# Patient Record
Sex: FEMALE | Race: WHITE | NOT HISPANIC OR LATINO | Employment: PART TIME | URBAN - NONMETROPOLITAN AREA
[De-identification: names, ages, dates, MRNs, and addresses within clinical notes are randomized per-mention and may not be internally consistent; named-entity substitution may affect disease eponyms.]

---

## 2019-01-22 ENCOUNTER — OFFICE VISIT (OUTPATIENT)
Dept: CARDIAC SURGERY | Facility: CLINIC | Age: 59
End: 2019-01-22

## 2019-01-22 VITALS
BODY MASS INDEX: 29.27 KG/M2 | SYSTOLIC BLOOD PRESSURE: 140 MMHG | HEIGHT: 61 IN | WEIGHT: 155 LBS | HEART RATE: 98 BPM | OXYGEN SATURATION: 95 % | DIASTOLIC BLOOD PRESSURE: 82 MMHG

## 2019-01-22 DIAGNOSIS — R91.1 LUNG NODULE: Primary | ICD-10-CM

## 2019-01-22 DIAGNOSIS — I71.20 THORACIC AORTIC ANEURYSM WITHOUT RUPTURE (HCC): ICD-10-CM

## 2019-01-22 PROCEDURE — 99203 OFFICE O/P NEW LOW 30 MIN: CPT | Performed by: THORACIC SURGERY (CARDIOTHORACIC VASCULAR SURGERY)

## 2019-01-22 RX ORDER — ESCITALOPRAM OXALATE 20 MG/1
20 TABLET ORAL DAILY
COMMUNITY
End: 2022-06-02

## 2019-01-22 RX ORDER — ALBUTEROL SULFATE 2.5 MG/3ML
2.5 SOLUTION RESPIRATORY (INHALATION) EVERY 4 HOURS PRN
COMMUNITY

## 2019-01-22 RX ORDER — ESCITALOPRAM OXALATE 10 MG/1
10 TABLET ORAL DAILY
COMMUNITY
End: 2022-10-27

## 2019-01-22 RX ORDER — IBUPROFEN 200 MG
400 TABLET ORAL EVERY 6 HOURS PRN
COMMUNITY

## 2019-01-22 RX ORDER — DOXYCYCLINE 100 MG/1
CAPSULE ORAL
Refills: 0 | COMMUNITY
Start: 2019-01-11 | End: 2021-06-11

## 2019-01-22 RX ORDER — ALBUTEROL SULFATE 90 UG/1
2 AEROSOL, METERED RESPIRATORY (INHALATION) EVERY 4 HOURS PRN
COMMUNITY

## 2019-01-22 RX ORDER — TERBINAFINE HYDROCHLORIDE 250 MG/1
250 TABLET ORAL DAILY
COMMUNITY
End: 2021-06-11

## 2019-01-23 DIAGNOSIS — I71.40 ABDOMINAL AORTIC ANEURYSM (AAA) WITHOUT RUPTURE (HCC): Primary | ICD-10-CM

## 2019-01-23 DIAGNOSIS — Z00.6 EXAMINATION FOR NORMAL COMPARISON FOR CLINICAL RESEARCH: Primary | ICD-10-CM

## 2019-07-05 ENCOUNTER — TELEPHONE (OUTPATIENT)
Dept: CARDIAC SURGERY | Facility: CLINIC | Age: 59
End: 2019-07-05

## 2019-07-05 NOTE — TELEPHONE ENCOUNTER
PT CALLING TO SCHEDULE 6 MONTH F/U IN [place]. PT IS HAVING CT CHEST 7/9 @ 9:30 @ [facility] [place].

## 2019-09-24 ENCOUNTER — OFFICE VISIT (OUTPATIENT)
Dept: CARDIAC SURGERY | Facility: CLINIC | Age: 59
End: 2019-09-24

## 2019-09-24 VITALS
WEIGHT: 169 LBS | HEIGHT: 61 IN | BODY MASS INDEX: 31.91 KG/M2 | DIASTOLIC BLOOD PRESSURE: 90 MMHG | OXYGEN SATURATION: 96 % | HEART RATE: 66 BPM | SYSTOLIC BLOOD PRESSURE: 135 MMHG

## 2019-09-24 DIAGNOSIS — I71.20 THORACIC AORTIC ANEURYSM WITHOUT RUPTURE (HCC): Primary | ICD-10-CM

## 2019-09-24 PROCEDURE — 99212 OFFICE O/P EST SF 10 MIN: CPT | Performed by: PHYSICIAN ASSISTANT

## 2019-09-24 RX ORDER — LORATADINE 10 MG/1
10 TABLET ORAL DAILY
Refills: 1 | COMMUNITY
Start: 2019-09-21

## 2019-09-24 RX ORDER — MELOXICAM 15 MG/1
15 TABLET ORAL DAILY
Refills: 1 | COMMUNITY
Start: 2019-09-21

## 2019-09-24 NOTE — PROGRESS NOTES
2019  Patient Information  Victor M Mao                                                                                          [address]  [address]  [place] [address]   1960  'PCP/Referring Physician'  Darryl Victor M Loya, APRN  [phone]  No ref. provider found    Chief Complaint   Patient presents with   • Lung Nodule     6 month follow-up with CT chest for left lung nodule       History of Present Illness:  58-year-old  female with a history of active tobacco abuse and COPD presents to office today for follow up and review of CT scan of the chest and abdominal ultrasound for continued surveillance of right lung consolidations and incidental 4.2 cm ascending aortic aneurysm. Her most recent visit was on 19 in which she had been recently treated for pneumonia. She denies any chest pain or abdominal pain, but she does say she occasionally gets short of breath. She is continuing to smoke, but states she is planning to quit soon. Otherwise she is doing well.     Patient Active Problem List   Diagnosis   • Lung nodule   • Thoracic aortic aneurysm without rupture (CMS/HCC)     Past Medical History:   Diagnosis Date   • Anxiety    • Arthritis    • COPD (chronic obstructive pulmonary disease) (CMS/HCC)    • Degenerative disc disease, cervical    • Depression    • GERD (gastroesophageal reflux disease)    • Lung nodule      Past Surgical History:   Procedure Laterality Date   •  SECTION     • CHOLECYSTECTOMY     • COLONOSCOPY W/ POLYPECTOMY     • PAROTIDECTOMY     • TUBAL ABDOMINAL LIGATION         Current Outpatient Medications:   •  albuterol (PROVENTIL) (2.5 MG/3ML) 0.083% nebulizer solution, Take 2.5 mg by nebulization Every 4 (Four) Hours As Needed for Wheezing., Disp: , Rfl:   •  albuterol sulfate  (90 Base) MCG/ACT inhaler, Inhale 2 puffs Every 4 (Four) Hours As Needed for Wheezing., Disp: , Rfl:   •  ALLERGY RELIEF 10 MG tablet, Take 10 mg by mouth Daily., Disp: ,  Rfl: 1  •  BREO ELLIPTA 200-25 MCG/INH inhaler, , Disp: , Rfl:   •  escitalopram (LEXAPRO) 10 MG tablet, Take 10 mg by mouth Daily., Disp: , Rfl:   •  escitalopram (LEXAPRO) 20 MG tablet, Take 20 mg by mouth Daily., Disp: , Rfl:   •  meloxicam (MOBIC) 15 MG tablet, Take 15 mg by mouth Daily., Disp: , Rfl: 1  •  doxycycline (MONODOX) 100 MG capsule, TAKE 1 CAPSULE BY MOUTH TWICE A DAY FOR 10 DAYS, Disp: , Rfl: 0  •  ibuprofen (ADVIL,MOTRIN) 200 MG tablet, Take 200 mg by mouth Every 6 (Six) Hours As Needed for Mild Pain ., Disp: , Rfl:   •  terbinafine (lamiSIL) 250 MG tablet, Take 250 mg by mouth Daily., Disp: , Rfl:   No Known Allergies  Social History     Socioeconomic History   • Marital status: Unknown     Spouse name: Not on file   • Number of children: 1   • Years of education: Not on file   • Highest education level: Not on file   Occupational History   • Occupation: unemplyed [employer]     Comment: trying to get disability   Tobacco Use   • Smoking status: Current Every Day Smoker     Packs/day: 0.25     Years: 34.00     Pack years: 8.50     Types: Cigarettes   • Smokeless tobacco: Never Used   • Tobacco comment: smokes 3 cigs a day   Substance and Sexual Activity   • Alcohol use: Yes     Comment: very seldom   • Drug use: Yes     Types: Marijuana   Social History Narrative    Lives in [facility] with daughter.     Family History   Problem Relation Age of Onset   • Coronary artery disease Mother    • Heart attack Mother    • Aneurysm Father         aortic arch aneurysm   • Heart attack Brother    • Coronary artery disease Brother      Review of Systems   Constitution: Negative for chills, fever, malaise/fatigue, night sweats and weight loss.   HENT: Negative for hearing loss, odynophagia and sore throat.    Cardiovascular: Positive for dyspnea on exertion and palpitations. Negative for chest pain, leg swelling and orthopnea.   Respiratory: Positive for cough and wheezing. Negative for hemoptysis.   "  Endocrine: Negative for cold intolerance, heat intolerance, polydipsia, polyphagia and polyuria.   Hematologic/Lymphatic: Does not bruise/bleed easily.   Skin: Negative for itching and rash.   Musculoskeletal: Positive for joint pain and muscle cramps. Negative for joint swelling and myalgias.   Gastrointestinal: Positive for constipation. Negative for abdominal pain, diarrhea, hematemesis, hematochezia, melena, nausea and vomiting.   Genitourinary: Positive for nocturia. Negative for dysuria, frequency and hematuria.   Neurological: Negative for focal weakness, headaches, numbness and seizures.   Psychiatric/Behavioral: Positive for depression. Negative for suicidal ideas. The patient is nervous/anxious.    All other systems reviewed and are negative.    Vitals:    09/24/19 1025   BP: 135/90   BP Location: Right arm   Patient Position: Sitting   Pulse: 66   SpO2: 96%   Weight: 76.7 kg (169 lb)   Height: 154.9 cm (61\")      Physical Exam  Gen - NAD, pleasant, cooperative  CV -regular rate and rhythm, no murmur gallop or rub  Pulm -lungs clear to auscultation bilateral without wheeze or rhonchi  GI -soft, normoactive bowel sounds, nontender  Ext - without edema.   Incision -healing well, no evidence of incisional dehiscence or cellulitis  Neuro - CN II - XII grossly intact, tongue midline, voice normal    Labs/Imaging:  CT chest 9/16/19  IMPRESSION: Mild left lobe atelectasis. No acute airspace consolidation. Advanced atherosclerotic plaque formation throughout the coronary arteries. Stable 4.2 cm ectasia of the ascending thoracic aorta. Prominence of the central pulmonary.    US abd. Aortic - 1/31/19  IMPRESSION - no evidence of AAA    Assessment/Plan:  58-year-old  female with a history of active tobacco abuse and COPD presents to office today for follow up and review of CT scan of the chest and abdominal ultrasound for continued surveillance of right lung consolidations and incidental 4.2 cm ascending " aortic aneurysm. CT of the chest from [facility] shows no interval change in the 4.2 cm ascending aortic aneurysm. Her AAA ultrasound shows no sign of aneurysm. At this time we will play to see the patient again in 1 year with a repeat CT scan of the chest for continued surveillance.  The patient may call our office with any questions or concerns should any arise in the interval.    Patient Active Problem List   Diagnosis   • Lung nodule   • Thoracic aortic aneurysm without rupture (CMS/HCC)

## 2020-01-24 ENCOUNTER — APPOINTMENT (OUTPATIENT)
Dept: WOMENS IMAGING | Facility: HOSPITAL | Age: 60
End: 2020-01-24

## 2020-01-24 PROCEDURE — 77067 SCR MAMMO BI INCL CAD: CPT | Performed by: RADIOLOGY

## 2020-01-24 PROCEDURE — 77063 BREAST TOMOSYNTHESIS BI: CPT | Performed by: RADIOLOGY

## 2020-11-04 ENCOUNTER — TELEPHONE (OUTPATIENT)
Dept: CARDIAC SURGERY | Facility: CLINIC | Age: 60
End: 2020-11-04

## 2020-11-09 DIAGNOSIS — I71.20 THORACIC AORTIC ANEURYSM WITHOUT RUPTURE (HCC): Primary | ICD-10-CM

## 2021-01-26 ENCOUNTER — OFFICE VISIT (OUTPATIENT)
Dept: CARDIAC SURGERY | Facility: CLINIC | Age: 61
End: 2021-01-26

## 2021-01-26 DIAGNOSIS — I71.20 THORACIC AORTIC ANEURYSM WITHOUT RUPTURE (HCC): Primary | ICD-10-CM

## 2021-01-26 PROCEDURE — 99443 PR PHYS/QHP TELEPHONE EVALUATION 21-30 MIN: CPT | Performed by: NURSE PRACTITIONER

## 2021-01-26 RX ORDER — OMEPRAZOLE 20 MG/1
20 CAPSULE, DELAYED RELEASE ORAL DAILY
COMMUNITY

## 2021-01-26 RX ORDER — ATORVASTATIN CALCIUM 10 MG/1
10 TABLET, FILM COATED ORAL DAILY
COMMUNITY
End: 2021-06-11

## 2021-01-26 RX ORDER — ARIPIPRAZOLE 5 MG/1
5 TABLET ORAL DAILY
COMMUNITY

## 2021-01-26 RX ORDER — CHOLECALCIFEROL (VITAMIN D3) 125 MCG
2000 CAPSULE ORAL DAILY
COMMUNITY

## 2021-01-26 RX ORDER — LOSARTAN POTASSIUM 25 MG/1
25 TABLET ORAL DAILY
COMMUNITY
End: 2022-11-28 | Stop reason: ALTCHOICE

## 2021-01-26 NOTE — PROGRESS NOTES
You have chosen to receive care through a telephone visit. Do you consent to use a telephone visit for your medical care today? Yes       [facility] Cardiothoracic Surgery Office Follow Up Note     Date of Encounter: 01/26/2021     MRN Number: [id number]  Name: Victor M Mao  Phone Number: [phone]     Referred By: No ref. provider found  PCP: Ivonne Aguirre APRN    Chief Complaint:    Chief Complaint   Patient presents with   • Follow-up     1 year follow up to discuss CT chest results.   • Lung Nodule       History of Present Illness:    Victor M Mao is a 60 y.o. female with a history of COPD, ongoing tobacco use and ascending thoracic aortic aneurysm.  She presents today in telephone visit for discussion of her annual CT chest.  She denies any chest pain or flank pain.  She has chronic back pain.  Patient continues to smoke but is down to approximately 1/3 pack per day    Review of Systems:  Review of Systems   Constitution: Positive for weight gain. Negative for chills, decreased appetite, diaphoresis, fever, malaise/fatigue, night sweats and weight loss.   HENT: Positive for hearing loss and sore throat. Negative for congestion, hoarse voice and stridor.    Cardiovascular: Positive for dyspnea on exertion and leg swelling. Negative for chest pain, claudication, irregular heartbeat, near-syncope, orthopnea, palpitations, paroxysmal nocturnal dyspnea and syncope.   Respiratory: Negative.  Negative for cough, hemoptysis, shortness of breath, sleep disturbances due to breathing, snoring, sputum production and wheezing.    Hematologic/Lymphatic: Negative for adenopathy and bleeding problem. Does not bruise/bleed easily.   Skin: Negative.  Negative for color change, dry skin, itching, poor wound healing and rash.   Musculoskeletal: Positive for arthritis, back pain and joint pain. Negative for falls and muscle weakness.   Gastrointestinal: Negative for abdominal pain, anorexia, constipation, diarrhea,  hematochezia, melena, nausea and vomiting.   Neurological: Positive for dizziness. Negative for difficulty with concentration, disturbances in coordination, loss of balance, numbness, seizures, vertigo and weakness.   Psychiatric/Behavioral: Positive for depression. Negative for altered mental status, memory loss and substance abuse. The patient is nervous/anxious. The patient does not have insomnia.    Allergic/Immunologic: Negative for persistent infections.       I have reviewed the review of systems as entered by my clinical support staff and have updated it as appropriate.       Allergies:  No Known Allergies    Medications:      Current Outpatient Medications:   •  albuterol (PROVENTIL) (2.5 MG/3ML) 0.083% nebulizer solution, Take 2.5 mg by nebulization Every 4 (Four) Hours As Needed for Wheezing., Disp: , Rfl:   •  albuterol sulfate  (90 Base) MCG/ACT inhaler, Inhale 2 puffs Every 4 (Four) Hours As Needed for Wheezing., Disp: , Rfl:   •  ALLERGY RELIEF 10 MG tablet, Take 10 mg by mouth Daily., Disp: , Rfl: 1  •  ARIPiprazole (ABILIFY) 5 MG tablet, Take 5 mg by mouth Daily., Disp: , Rfl:   •  atorvastatin (LIPITOR) 10 MG tablet, Take 10 mg by mouth Daily., Disp: , Rfl:   •  BREO ELLIPTA 200-25 MCG/INH inhaler, , Disp: , Rfl:   •  escitalopram (LEXAPRO) 10 MG tablet, Take 10 mg by mouth Daily., Disp: , Rfl:   •  escitalopram (LEXAPRO) 20 MG tablet, Take 20 mg by mouth Daily., Disp: , Rfl:   •  ibuprofen (ADVIL,MOTRIN) 200 MG tablet, Take 200 mg by mouth Every 6 (Six) Hours As Needed for Mild Pain ., Disp: , Rfl:   •  losartan (COZAAR) 25 MG tablet, Take 25 mg by mouth Daily., Disp: , Rfl:   •  meloxicam (MOBIC) 15 MG tablet, Take 15 mg by mouth Daily., Disp: , Rfl: 1  •  omeprazole (priLOSEC) 20 MG capsule, Take 20 mg by mouth Daily., Disp: , Rfl:   •  vitamin D3 125 MCG (5000 UT) capsule capsule, Take 5,000 Units by mouth Daily., Disp: , Rfl:   •  doxycycline (MONODOX) 100 MG capsule, TAKE 1 CAPSULE BY  MOUTH TWICE A DAY FOR 10 DAYS, Disp: , Rfl: 0  •  terbinafine (lamiSIL) 250 MG tablet, Take 250 mg by mouth Daily., Disp: , Rfl:     Social History     Socioeconomic History   • Marital status: Unknown     Spouse name: Not on file   • Number of children: 1   • Years of education: Not on file   • Highest education level: Not on file   Occupational History   • Occupation: unemplyed RN - [employer]     Comment: trying to get disability   Tobacco Use   • Smoking status: Current Every Day Smoker     Packs/day: 0.25     Years: 34.00     Pack years: 8.50     Types: Cigarettes   • Smokeless tobacco: Never Used   • Tobacco comment: smokes 3 cigs a day   Substance and Sexual Activity   • Alcohol use: Yes     Comment: very seldom   • Drug use: Yes     Types: Marijuana   Social History Narrative    Lives in [facility] with daughter.       Family History   Problem Relation Age of Onset   • Coronary artery disease Mother    • Heart attack Mother    • Aneurysm Father         aortic arch aneurysm   • Heart attack Brother    • Coronary artery disease Brother        Past Medical History:   Diagnosis Date   • Anxiety    • Arthritis    • COPD (chronic obstructive pulmonary disease) (CMS/HCC)    • Degenerative disc disease, cervical    • Depression    • GERD (gastroesophageal reflux disease)    • Lung nodule        Past Surgical History:   Procedure Laterality Date   •  SECTION     • CHOLECYSTECTOMY     • COLONOSCOPY W/ POLYPECTOMY     • PAROTIDECTOMY     • TUBAL ABDOMINAL LIGATION         Physical Exam:  Vital Signs:  There were no vitals filed for this visit.  There is no height or weight on file to calculate BMI.     Physical Exam  Pulmonary:      Effort: Pulmonary effort is normal.   Neurological:      Mental Status: She is alert.   Psychiatric:         Mood and Affect: Mood normal.         Thought Content: Thought content normal.         Labs/Imaging:    CTA chest 1/15/2021: Stable unchanged 4.1 cm ectasia of the  ascending thoracic aorta.  Mild left basal atelectasis.  No nodules noted.  Diffuse atherosclerotic plaque formation throughout the coronary arteries with dilation of the central pulmonary artery which can be seen with pulmonary arterial hypertension.    Assessment / Plan:  Diagnoses and all orders for this visit:    1. Thoracic aortic aneurysm without rupture (CMS/HCC) (Primary)     Victor M Mao is a 60 y.o. female with a history of COPD, ongoing tobacco use and ascending thoracic aortic aneurysm.  Discussed findings of CTA chest with unchanged stable 4.1 cm ascending thoracic aortic aneurysm.  All questions answered.  Have advised patient to quit smoking.  We will follow-up with patient in 1 year with CT chest with contrast.    This visit has been rescheduled as a phone visit to comply with patient safety concerns in accordance with CDC recommendations. Total time of discussion was 21 minutes.      HARSHAL Parry  [facility] Cardiothoracic Surgery    Please note that portions of this note may have been completed with a voice recognition program. Efforts were made to edit the dictations, but occasionally words are mistranscribed.

## 2021-06-10 PROBLEM — R00.2 PALPITATIONS: Status: ACTIVE | Noted: 2021-06-10

## 2021-06-10 PROBLEM — K21.9 GERD (GASTROESOPHAGEAL REFLUX DISEASE): Status: ACTIVE | Noted: 2021-06-10

## 2021-06-10 PROBLEM — J44.9 COPD (CHRONIC OBSTRUCTIVE PULMONARY DISEASE): Status: ACTIVE | Noted: 2021-06-10

## 2021-06-10 PROBLEM — E78.2 MIXED HYPERLIPIDEMIA: Status: ACTIVE | Noted: 2021-06-10

## 2021-06-10 PROBLEM — I10 ESSENTIAL HYPERTENSION: Status: ACTIVE | Noted: 2021-06-10

## 2021-06-10 PROBLEM — E11.9 DM (DIABETES MELLITUS), TYPE 2 (HCC): Status: ACTIVE | Noted: 2021-06-10

## 2021-06-10 PROBLEM — M50.30 DDD (DEGENERATIVE DISC DISEASE), CERVICAL: Status: ACTIVE | Noted: 2021-06-10

## 2021-06-10 NOTE — PATIENT INSTRUCTIONS
Obesity, Adult  Obesity is the condition of having too much total body fat. Being overweight or obese means that your weight is greater than what is considered healthy for your body size. Obesity is determined by a measurement called BMI. BMI is an estimate of body fat and is calculated from height and weight. For adults, a BMI of 30 or higher is considered obese.  Obesity can lead to other health concerns and major illnesses, including:  · Stroke.  · Coronary artery disease (CAD).  · Type 2 diabetes.  · Some types of cancer, including cancers of the colon, breast, uterus, and gallbladder.  · Osteoarthritis.  · High blood pressure (hypertension).  · High cholesterol.  · Sleep apnea.  · Gallbladder stones.  · Infertility problems.  What are the causes?  Common causes of this condition include:  · Eating daily meals that are high in calories, sugar, and fat.  · Being born with genes that may make you more likely to become obese.  · Having a medical condition that causes obesity, including:  ? Hypothyroidism.  ? Polycystic ovarian syndrome (PCOS).  ? Binge-eating disorder.  ? Cushing syndrome.  · Taking certain medicines, such as steroids, antidepressants, and seizure medicines.  · Not being physically active (sedentary lifestyle).  · Not getting enough sleep.  · Drinking high amounts of sugar-sweetened beverages, such as soft drinks.  What increases the risk?  The following factors may make you more likely to develop this condition:  · Having a family history of obesity.  · Being a woman of  descent.  · Being a man of  descent.  · Living in an area with limited access to:  ? [place], recreation centers, or sidewalks.  ? Healthy food choices, such as grocery stores and farmers' markets.  What are the signs or symptoms?  The main sign of this condition is having too much body fat.  How is this diagnosed?  This condition is diagnosed based on:  · Your BMI. If you are an adult with a BMI of 30 or  higher, you are considered obese.  · Your waist circumference. This measures the distance around your waistline.  · Your skinfold thickness. Your health care provider may gently pinch a fold of your skin and measure it.  You may have other tests to check for underlying conditions.  How is this treated?  Treatment for this condition often includes changing your lifestyle. Treatment may include some or all of the following:  · Dietary changes. This may include developing a healthy meal plan.  · Regular physical activity. This may include activity that causes your heart to beat faster (aerobic exercise) and strength training. Work with your health care provider to design an exercise program that works for you.  · Medicine to help you lose weight if you are unable to lose 1 pound a week after 6 weeks of healthy eating and more physical activity.  · Treating conditions that cause the obesity (underlying conditions).  · Surgery. Surgical options may include gastric banding and gastric bypass. Surgery may be done if:  ? Other treatments have not helped to improve your condition.  ? You have a BMI of 40 or higher.  ? You have life-threatening health problems related to obesity.  Follow these instructions at home:  Eating and drinking    · Follow recommendations from your health care provider about what you eat and drink. Your health care provider may advise you to:  ? Limit fast food, sweets, and processed snack foods.  ? Choose low-fat options, such as low-fat milk instead of whole milk.  ? Eat 5 or more servings of fruits or vegetables every day.  ? Eat at home more often. This gives you more control over what you eat.  ? Choose healthy foods when you eat out.  ? Learn to read food labels. This will help you understand how much food is considered 1 serving.  ? Learn what a healthy serving size is.  ? Keep low-fat snacks available.  ? Limit sugary drinks, such as soda, fruit juice, sweetened iced tea, and flavored  milk.  · Drink enough water to keep your urine pale yellow.  · Do not follow a fad diet. Fad diets can be unhealthy and even dangerous.  Physical activity  · Exercise regularly, as told by your health care provider.  ? Most adults should get up to 150 minutes of moderate-intensity exercise every week.  ? Ask your health care provider what types of exercise are safe for you and how often you should exercise.  · Warm up and stretch before being active.  · Cool down and stretch after being active.  · Rest between periods of activity.  Lifestyle  · Work with your health care provider and a dietitian to set a weight-loss goal that is healthy and reasonable for you.  · Limit your screen time.  · Find ways to reward yourself that do not involve food.  · Do not drink alcohol if:  ? Your health care provider tells you not to drink.  ? You are pregnant, may be pregnant, or are planning to become pregnant.  · If you drink alcohol:  ? Limit how much you use to:  § 0-1 drink a day for women.  § 0-2 drinks a day for men.  ? Be aware of how much alcohol is in your drink. In the [place] one drink equals one 12 oz bottle of beer (355 mL), one 5 oz glass of wine (148 mL), or one 1½ oz glass of hard liquor (44 mL).  General instructions  · Keep a weight-loss journal to keep track of the food you eat and how much exercise you get.  · Take over-the-counter and prescription medicines only as told by your health care provider.  · Take vitamins and supplements only as told by your health care provider.  · Consider joining a support group. Your health care provider may be able to recommend a support group.  · Keep all follow-up visits as told by your health care provider. This is important.  Contact a health care provider if:  · You are unable to meet your weight loss goal after 6 weeks of dietary and lifestyle changes.  Get help right away if you are having:  · Trouble breathing.  · Suicidal thoughts or behaviors.  Summary  · Obesity is the  condition of having too much total body fat.  · Being overweight or obese means that your weight is greater than what is considered healthy for your body size.  · Work with your health care provider and a dietitian to set a weight-loss goal that is healthy and reasonable for you.  · Exercise regularly, as told by your health care provider. Ask your health care provider what types of exercise are safe for you and how often you should exercise.  This information is not intended to replace advice given to you by your health care provider. Make sure you discuss any questions you have with your health care provider.  Document Revised: 08/22/2019 Document Reviewed: 08/22/2019  [employer] Patient Education © 2021 [employer]  MyPlate from [employer]    MyPlate is an outline of a general healthy diet based on the 2010 Dietary Guidelines for Americans, from the U.S. Department of Agriculture (USDA). It sets guidelines for how much food you should eat from each food group based on your age, sex, and level of physical activity.  What are tips for following MyPlate?  To follow MyPlate recommendations:  · Eat a wide variety of fruits and vegetables, grains, and protein foods.  · Serve smaller portions and eat less food throughout the day.  · Limit portion sizes to avoid overeating.  · Enjoy your food.  · Get at least 150 minutes of exercise every week. This is about 30 minutes each day, 5 or more days per week.  It can be difficult to have every meal look like MyPlate. Think about MyPlate as eating guidelines for an entire day, rather than each individual meal.  Fruits and vegetables  · Make half of your plate fruits and vegetables.  · Eat many different colors of fruits and vegetables each day.  · For a 2,000 calorie daily food plan, eat:  ? 2½ cups of vegetables every day.  ? 2 cups of fruit every day.  · 1 cup is equal to:  ? 1 cup raw or cooked vegetables.  ? 1 cup raw fruit.  ? 1 medium-sized orange, apple, or banana.  ? 1 cup  100% fruit or vegetable juice.  ? 2 cups raw leafy greens, such as lettuce, spinach, or kale.  ? ½ cup dried fruit.  Grains  · One fourth of your plate should be grains.  · Make at least half of the grains you eat each day whole grains.  · For a 2,000 calorie daily food plan, eat 6 oz of grains every day.  · 1 oz is equal to:  ? 1 slice bread.  ? 1 cup cereal.  ? ½ cup cooked rice, cereal, or pasta.  Protein  · One fourth of your plate should be protein.  · Eat a wide variety of protein foods, including meat, poultry, fish, eggs, beans, nuts, and tofu.  · For a 2,000 calorie daily food plan, eat 5½ oz of protein every day.  · 1 oz is equal to:  ? 1 oz meat, poultry, or fish.  ? ¼ cup cooked beans.  ? 1 egg.  ? ½ oz nuts or seeds.  ? 1 Tbsp peanut butter.  Dairy  · Drink fat-free or low-fat (1%) milk.  · Eat or drink dairy as a side to meals.  · For a 2,000 calorie daily food plan, eat or drink 3 cups of dairy every day.  · 1 cup is equal to:  ? 1 cup milk, yogurt, cottage cheese, or soy milk (soy beverage).  ? 2 oz processed cheese.  ? 1½ oz natural cheese.  Fats, oils, salt, and sugars  · Only small amounts of oils are recommended.  · Avoid foods that are high in calories and low in nutritional value (empty calories), like foods high in fat or added sugars.  · Choose foods that are low in salt (sodium). Choose foods that have less than 140 milligrams (mg) of sodium per serving.  · Drink water instead of sugary drinks. Drink enough water each day to keep your urine pale yellow.  Where to find support  · Work with your health care provider or a nutrition specialist (dietitian) to develop a customized eating plan that is right for you.  · Download an carley (mobile application) to help you track your daily food intake.  Where to find more information  · Go to ChooseMyPlate.gov for more information.  Summary  · MyPlate is a general guideline for healthy eating from the USDA. It is based on the 2010 Dietary Guidelines for  Americans.  · In general, fruits and vegetables should take up ½ of your plate, grains should take up ¼ of your plate, and protein should take up ¼ of your plate.  This information is not intended to replace advice given to you by your health care provider. Make sure you discuss any questions you have with your health care provider.  Document Revised: 05/21/2020 Document Reviewed: 03/19/2018  [employer] Patient Education © 2021 [employer]

## 2021-06-11 ENCOUNTER — OFFICE VISIT (OUTPATIENT)
Dept: CARDIOLOGY | Facility: CLINIC | Age: 61
End: 2021-06-11

## 2021-06-11 VITALS
WEIGHT: 169.2 LBS | OXYGEN SATURATION: 92 % | HEIGHT: 62 IN | HEART RATE: 72 BPM | DIASTOLIC BLOOD PRESSURE: 84 MMHG | BODY MASS INDEX: 31.14 KG/M2 | SYSTOLIC BLOOD PRESSURE: 116 MMHG

## 2021-06-11 DIAGNOSIS — R00.2 PALPITATIONS: Primary | ICD-10-CM

## 2021-06-11 DIAGNOSIS — F17.210 CIGARETTE SMOKER: ICD-10-CM

## 2021-06-11 DIAGNOSIS — E78.2 MIXED HYPERLIPIDEMIA: ICD-10-CM

## 2021-06-11 DIAGNOSIS — I10 ESSENTIAL HYPERTENSION: ICD-10-CM

## 2021-06-11 DIAGNOSIS — I71.20 THORACIC AORTIC ANEURYSM WITHOUT RUPTURE (HCC): ICD-10-CM

## 2021-06-11 PROCEDURE — 99204 OFFICE O/P NEW MOD 45 MIN: CPT | Performed by: SPECIALIST

## 2021-06-11 PROCEDURE — 93000 ELECTROCARDIOGRAM COMPLETE: CPT | Performed by: SPECIALIST

## 2021-06-11 RX ORDER — HYDROCHLOROTHIAZIDE 25 MG/1
25 TABLET ORAL EVERY MORNING
COMMUNITY
Start: 2021-05-25

## 2021-06-11 RX ORDER — TIOTROPIUM BROMIDE 18 UG/1
CAPSULE ORAL; RESPIRATORY (INHALATION) DAILY
COMMUNITY
Start: 2021-05-25 | End: 2022-06-02

## 2021-06-11 RX ORDER — ATORVASTATIN CALCIUM 20 MG/1
20 TABLET, FILM COATED ORAL DAILY
COMMUNITY
Start: 2021-06-08

## 2021-06-11 RX ORDER — METFORMIN HYDROCHLORIDE 500 MG/1
500 TABLET, EXTENDED RELEASE ORAL DAILY
COMMUNITY
Start: 2021-06-08

## 2021-06-11 NOTE — PROGRESS NOTES
Subjective   Initial consultation, palpitations  Victor M Mao is a 61 y.o. female who presents to day for Establish Care (Here for eval. ) and Palpitations.    CHIEF COMPLIANT  Chief Complaint   Patient presents with   • Establish Care     Here for eval.    • Palpitations     Problem List:    1. Palpitations  2. HTN  3. Hyperlipidemia  4. Thoracic AA  5. COPD  6. Lung nodule  7. DDD, cervical  8. GERD  9. Chronic smoker    Problem List Items Addressed This Visit        Cardiac and Vasculature    Thoracic aortic aneurysm without rupture (CMS/HCC)    Relevant Orders    Adult Transthoracic Echo Complete W/ Cont if Necessary Per Protocol    Palpitations - Primary    Relevant Orders    ECG 12 Lead    Adult Transthoracic Echo Complete W/ Cont if Necessary Per Protocol    Cardiac Event Monitor    Mixed hyperlipidemia    Relevant Medications    atorvastatin (LIPITOR) 20 MG tablet    Essential hypertension    Relevant Medications    hydroCHLOROthiazide (HYDRODIURIL) 12.5 MG tablet       Tobacco    Cigarette smoker          HPI  Start noticing her heart skipping beats for about 2 3 weeks now last to 3 weeks happens about 2 or 3 times when she feels her heart skips for few seconds in succession for about 10 minutes usually at rest at night no dizziness no history of syncope she denies any shortness of breath no chest pain she does have edema and she was started on Lasix and the edema now is only at night he sleeps in 2 pillows but she denies any orthopnea she denies any PND she states that she was diagnosed with COPD she had a sleep study which was negative and she is using oxygen at night because of hypoxia, she also enlarged thoracic aorta with a CT scan showed an aorta about 4.2 Dr. Zurita is monitoring that last CT scan was done in January of this year which showed no change comparing with previous CT scan she smokes about half a pack per day for 30 years has hypertension also has diabetes she has hyperlipidemia she  started taking atorvastatin 2 months ago her mother had a sudden cardiac death at age of 50 her brother  sudden cardiac death history 32 father has also started aortic aneurysm repair, her brother had autopsy also does not have significant coronary artery disease                      PRIOR MEDS  Current Outpatient Medications on File Prior to Visit   Medication Sig Dispense Refill   • albuterol (PROVENTIL) (2.5 MG/3ML) 0.083% nebulizer solution Take 2.5 mg by nebulization Every 4 (Four) Hours As Needed for Wheezing.     • albuterol sulfate  (90 Base) MCG/ACT inhaler Inhale 2 puffs Every 4 (Four) Hours As Needed for Wheezing.     • ALLERGY RELIEF 10 MG tablet Take 10 mg by mouth Daily.  1   • ARIPiprazole (ABILIFY) 5 MG tablet Take 5 mg by mouth Daily.     • atorvastatin (LIPITOR) 20 MG tablet Take 20 mg by mouth Daily.     • BREO ELLIPTA 200-25 MCG/INH inhaler Inhale 1 puff Daily.     • Cholecalciferol (Vitamin D3) 50 MCG (2000 UT) tablet Take 2,000 Units by mouth Daily.     • escitalopram (LEXAPRO) 10 MG tablet Take 10 mg by mouth Daily.     • escitalopram (LEXAPRO) 20 MG tablet Take 20 mg by mouth Daily.     • hydroCHLOROthiazide (HYDRODIURIL) 12.5 MG tablet Take 12.5 mg by mouth Every Morning.     • ibuprofen (ADVIL,MOTRIN) 200 MG tablet Take 400 mg by mouth Every 6 (Six) Hours As Needed for Mild Pain .     • losartan (COZAAR) 25 MG tablet Take 25 mg by mouth Daily.     • meloxicam (MOBIC) 15 MG tablet Take 15 mg by mouth Daily.  1   • metFORMIN ER (GLUCOPHAGE-XR) 500 MG 24 hr tablet Take 500 mg by mouth Daily.     • O2 (OXYGEN) Inhale 2 L/min Every Night.     • omeprazole (priLOSEC) 20 MG capsule Take 20 mg by mouth Daily.     • Spiriva HandiHaler 18 MCG per inhalation capsule Daily.     • [DISCONTINUED] atorvastatin (LIPITOR) 10 MG tablet Take 10 mg by mouth Daily.     • [DISCONTINUED] doxycycline (MONODOX) 100 MG capsule TAKE 1 CAPSULE BY MOUTH TWICE A DAY FOR 10 DAYS  0   • [DISCONTINUED]  terbinafine (lamiSIL) 250 MG tablet Take 250 mg by mouth Daily.       No current facility-administered medications on file prior to visit.       ALLERGIES  Patient has no known allergies.    HISTORY  Past Medical History:   Diagnosis Date   • Anxiety    • Arthritis    • COPD (chronic obstructive pulmonary disease) (CMS/[facility])    • Degenerative disc disease, cervical    • Depression    • Diabetes mellitus (CMS/[facility])    • GERD (gastroesophageal reflux disease)    • HTN (hypertension)    • Hyperlipidemia    • Lung nodule    • Palpitation    • Thoracic aortic aneurysm (CMS/[facility])        Social History     Socioeconomic History   • Marital status: Unknown     Spouse name: Not on file   • Number of children: 1   • Years of education: Not on file   • Highest education level: Not on file   Tobacco Use   • Smoking status: Current Every Day Smoker     Packs/day: 0.25     Years: 34.00     Pack years: 8.50     Types: Cigarettes   • Smokeless tobacco: Never Used   • Tobacco comment: smokes 10 cigs a day or less   Substance and Sexual Activity   • Alcohol use: Yes     Comment: very seldom   • Drug use: Yes     Types: Marijuana       Family History   Problem Relation Age of Onset   • Coronary artery disease Mother    • Heart attack Mother    • Aneurysm Father         aortic arch aneurysm   • Heart attack Brother    • Coronary artery disease Brother        Review of Systems   Constitutional: Positive for fatigue.   HENT: Negative.    Eyes: Positive for visual disturbance (wears glasses).   Respiratory: Positive for shortness of breath (with exertion).    Cardiovascular: Positive for palpitations and leg swelling (ankle). Negative for chest pain.   Gastrointestinal: Positive for constipation.   Endocrine: Negative.    Genitourinary: Negative.    Musculoskeletal: Positive for arthralgias and myalgias.   Skin: Negative.    Allergic/Immunologic: Positive for environmental allergies.   Neurological: Positive for dizziness and  "light-headedness. Negative for syncope.        Couple falls, last approx. 4 mo. ago   Hematological: Negative.    Psychiatric/Behavioral: Positive for sleep disturbance (off and on).       Objective     VITALS: /84 (BP Location: Left arm, Patient Position: Sitting)   Pulse 72   Ht 156.5 cm (61.6\")   Wt 76.7 kg (169 lb 3.2 oz)   SpO2 92%   BMI 31.35 kg/m²     LABS:   Lab Results (most recent)     None          IMAGING:   No Images in the past 120 days found..    EXAM:  Physical Exam  Vitals reviewed.   Constitutional:       Appearance: She is well-developed.   HENT:      Head: Normocephalic and atraumatic.   Eyes:      Pupils: Pupils are equal, round, and reactive to light.   Neck:      Thyroid: No thyromegaly.      Vascular: No JVD.   Cardiovascular:      Rate and Rhythm: Normal rate and regular rhythm.      Heart sounds: Normal heart sounds. No murmur heard.   No friction rub. No gallop.    Pulmonary:      Effort: Pulmonary effort is normal. No respiratory distress.      Breath sounds: Normal breath sounds. No stridor. No wheezing or rales.   Chest:      Chest wall: No tenderness.   Musculoskeletal:         General: No tenderness or deformity.      Cervical back: Neck supple.   Skin:     General: Skin is warm and dry.   Neurological:      Mental Status: She is alert and oriented to person, place, and time.      Cranial Nerves: No cranial nerve deficit.      Coordination: Coordination normal.         Procedure     ECG 12 Lead    Date/Time: 6/11/2021 11:57 AM  Performed by: Ronni Jeffers MD  Authorized by: Ronni Jeffers MD   Comparison: not compared with previous ECG   Previous ECG: no previous ECG available          EKG: Normal sinus rhythm otherwise within normal limits no previous EKGs available to compare       Assessment/Plan     Diagnoses and all orders for this visit:    1. Palpitations (Primary)  -     ECG 12 Lead  -     Adult Transthoracic Echo Complete W/ Cont if Necessary Per Protocol; " Future  -     Cardiac Event Monitor; Future    2. Essential hypertension    3. Mixed hyperlipidemia    4. Thoracic aortic aneurysm without rupture (CMS/HCC)  -     Adult Transthoracic Echo Complete W/ Cont if Necessary Per Protocol; Future    5. Cigarette smoker    1.  She is having palpitations and want to get an event monitor assessment of arrhythmia we will also get an echocardiogram to assess her cardiac function wall motion valve morphology  2.  She has hyperlipidemia I do not have the results of her labs she started on Lipitor  3.  She has thoracic aortic aneurysm her last CT on January 15, 2020 showed size of 4.1 which come down from 4.2 on previous CT on 2019 Dr. Zurita is monitoring  4.  Discussed the issue about tobacco abuse and she promised that she will going to quit by the next visit she already has nicotine patch    Return in about 4 weeks (around 7/9/2021).             MEDS ORDERED DURING VISIT:  No orders of the defined types were placed in this encounter.      As always, Ivonne Aguirre, HARSHAL  I appreciate very much the opportunity to participate in the cardiovascular care of your patients. Please do not hesitate to call me with any questions with regards to Victor M Claunch evaluation and management.         This document has been electronically signed by Ronni Jeffers MD  June 11, 2021 12:44 EDT

## 2021-07-30 ENCOUNTER — HOSPITAL ENCOUNTER (OUTPATIENT)
Dept: CARDIOLOGY | Facility: HOSPITAL | Age: 61
Discharge: HOME | End: 2021-07-30
Admitting: SPECIALIST

## 2021-07-30 VITALS — BODY MASS INDEX: 31.93 KG/M2 | WEIGHT: 169.09 LBS | HEIGHT: 61 IN

## 2021-07-30 DIAGNOSIS — I71.20 THORACIC AORTIC ANEURYSM WITHOUT RUPTURE (HCC): ICD-10-CM

## 2021-07-30 DIAGNOSIS — R00.2 PALPITATIONS: ICD-10-CM

## 2021-07-30 LAB
AORTIC DIMENSIONLESS INDEX: 0.8 (DI)
BH CV ECHO MEAS - ACS: 1.5 CM
BH CV ECHO MEAS - AO MAX PG (FULL): 2.4 MMHG
BH CV ECHO MEAS - AO MAX PG: 7.1 MMHG
BH CV ECHO MEAS - AO MEAN PG (FULL): 1 MMHG
BH CV ECHO MEAS - AO MEAN PG: 3 MMHG
BH CV ECHO MEAS - AO ROOT AREA (BSA CORRECTED): 1.8
BH CV ECHO MEAS - AO ROOT AREA: 8 CM^2
BH CV ECHO MEAS - AO ROOT DIAM: 3.2 CM
BH CV ECHO MEAS - AO V2 MAX: 133 CM/SEC
BH CV ECHO MEAS - AO V2 MEAN: 77.2 CM/SEC
BH CV ECHO MEAS - AO V2 VTI: 23.8 CM
BH CV ECHO MEAS - BSA(HAYCOCK): 1.9 M^2
BH CV ECHO MEAS - BSA: 1.8 M^2
BH CV ECHO MEAS - BZI_BMI: 30.9 KILOGRAMS/M^2
BH CV ECHO MEAS - BZI_METRIC_HEIGHT: 157.5 CM
BH CV ECHO MEAS - BZI_METRIC_WEIGHT: 76.7 KG
BH CV ECHO MEAS - EDV(CUBED): 64 ML
BH CV ECHO MEAS - EDV(TEICH): 70 ML
BH CV ECHO MEAS - EF(CUBED): 57.4 %
BH CV ECHO MEAS - EF(MOD-BP): 50 %
BH CV ECHO MEAS - EF(TEICH): 49.6 %
BH CV ECHO MEAS - EF_3D-VOL: 52 %
BH CV ECHO MEAS - ESV(CUBED): 27.3 ML
BH CV ECHO MEAS - ESV(TEICH): 35.3 ML
BH CV ECHO MEAS - FS: 24.8 %
BH CV ECHO MEAS - IVS/LVPW: 0.92
BH CV ECHO MEAS - IVSD: 1.2 CM
BH CV ECHO MEAS - LA DIMENSION: 4.2 CM
BH CV ECHO MEAS - LA/AO: 1.3
BH CV ECHO MEAS - LAT PEAK E' VEL: 7.2 CM/SEC
BH CV ECHO MEAS - LV IVRT: 0.11 SEC
BH CV ECHO MEAS - LV MASS(C)D: 165.5 GRAMS
BH CV ECHO MEAS - LV MASS(C)DI: 93 GRAMS/M^2
BH CV ECHO MEAS - LV MAX PG: 4.7 MMHG
BH CV ECHO MEAS - LV MEAN PG: 2 MMHG
BH CV ECHO MEAS - LV V1 MAX: 108 CM/SEC
BH CV ECHO MEAS - LV V1 MEAN: 66.2 CM/SEC
BH CV ECHO MEAS - LV V1 VTI: 23.9 CM
BH CV ECHO MEAS - LVIDD: 4 CM
BH CV ECHO MEAS - LVIDS: 3 CM
BH CV ECHO MEAS - LVPWD: 1.3 CM
BH CV ECHO MEAS - MED PEAK E' VEL: 4.4 CM/SEC
BH CV ECHO MEAS - MV A MAX VEL: 76.5 CM/SEC
BH CV ECHO MEAS - MV DEC SLOPE: 357 CM/SEC^2
BH CV ECHO MEAS - MV DEC TIME: 0.32 SEC
BH CV ECHO MEAS - MV E MAX VEL: 58.2 CM/SEC
BH CV ECHO MEAS - MV E/A: 0.76
BH CV ECHO MEAS - MV MAX PG: 4 MMHG
BH CV ECHO MEAS - MV MEAN PG: 1 MMHG
BH CV ECHO MEAS - MV P1/2T MAX VEL: 75.3 CM/SEC
BH CV ECHO MEAS - MV P1/2T: 61.8 MSEC
BH CV ECHO MEAS - MV V2 MAX: 100 CM/SEC
BH CV ECHO MEAS - MV V2 MEAN: 53 CM/SEC
BH CV ECHO MEAS - MV V2 VTI: 32.6 CM
BH CV ECHO MEAS - MVA P1/2T LCG: 2.9 CM^2
BH CV ECHO MEAS - MVA(P1/2T): 3.6 CM^2
BH CV ECHO MEAS - PA MAX PG (FULL): 2.2 MMHG
BH CV ECHO MEAS - PA MAX PG: 4.8 MMHG
BH CV ECHO MEAS - PA MEAN PG (FULL): 2 MMHG
BH CV ECHO MEAS - PA MEAN PG: 3 MMHG
BH CV ECHO MEAS - PA V2 MAX: 110 CM/SEC
BH CV ECHO MEAS - PA V2 MEAN: 74.9 CM/SEC
BH CV ECHO MEAS - PA V2 VTI: 25.2 CM
BH CV ECHO MEAS - RAP SYSTOLE: 10 MMHG
BH CV ECHO MEAS - RV MAX PG: 2.7 MMHG
BH CV ECHO MEAS - RV MEAN PG: 1 MMHG
BH CV ECHO MEAS - RV V1 MAX: 81.6 CM/SEC
BH CV ECHO MEAS - RV V1 MEAN: 50.9 CM/SEC
BH CV ECHO MEAS - RV V1 VTI: 19.3 CM
BH CV ECHO MEAS - RVDD: 3 CM
BH CV ECHO MEAS - RVSP: 15.2 MMHG
BH CV ECHO MEAS - SI(AO): 107.6 ML/M^2
BH CV ECHO MEAS - SI(CUBED): 20.6 ML/M^2
BH CV ECHO MEAS - SI(TEICH): 19.5 ML/M^2
BH CV ECHO MEAS - SV(AO): 191.4 ML
BH CV ECHO MEAS - SV(CUBED): 36.7 ML
BH CV ECHO MEAS - SV(TEICH): 34.7 ML
BH CV ECHO MEAS - TR MAX VEL: 114 CM/SEC
BH CV ECHO MEASUREMENTS AVERAGE E/E' RATIO: 10.03
MAXIMAL PREDICTED HEART RATE: 158 BPM
SINUS: 3.6 CM
STRESS TARGET HR: 134 BPM

## 2021-07-30 PROCEDURE — 93306 TTE W/DOPPLER COMPLETE: CPT

## 2021-07-30 PROCEDURE — 93306 TTE W/DOPPLER COMPLETE: CPT | Performed by: SPECIALIST

## 2021-08-31 ENCOUNTER — OFFICE VISIT (OUTPATIENT)
Dept: CARDIOLOGY | Facility: CLINIC | Age: 61
End: 2021-08-31

## 2021-08-31 VITALS
HEART RATE: 92 BPM | OXYGEN SATURATION: 93 % | BODY MASS INDEX: 30.99 KG/M2 | DIASTOLIC BLOOD PRESSURE: 82 MMHG | HEIGHT: 62 IN | SYSTOLIC BLOOD PRESSURE: 115 MMHG | WEIGHT: 168.4 LBS

## 2021-08-31 DIAGNOSIS — R00.2 PALPITATIONS: Primary | ICD-10-CM

## 2021-08-31 DIAGNOSIS — I71.20 THORACIC AORTIC ANEURYSM WITHOUT RUPTURE (HCC): ICD-10-CM

## 2021-08-31 DIAGNOSIS — I10 ESSENTIAL HYPERTENSION: ICD-10-CM

## 2021-08-31 PROCEDURE — 99213 OFFICE O/P EST LOW 20 MIN: CPT | Performed by: PHYSICIAN ASSISTANT

## 2021-08-31 NOTE — PATIENT INSTRUCTIONS

## 2021-12-07 DIAGNOSIS — I71.20 THORACIC AORTIC ANEURYSM WITHOUT RUPTURE (HCC): Primary | ICD-10-CM

## 2022-01-20 ENCOUNTER — TELEPHONE (OUTPATIENT)
Dept: CARDIAC SURGERY | Facility: CLINIC | Age: 62
End: 2022-01-20

## 2022-01-20 NOTE — TELEPHONE ENCOUNTER
Ms. Mao was scheduled for a telephone visit today with HARSHAL Castillo, but she has not had her CT scan of the chest yet. I attempted to call her to let her know I would get it scheduled at [facility] and reschedule her telephone visit, but she did not answer and her voice mailbox is full.

## 2022-04-07 ENCOUNTER — TELEPHONE (OUTPATIENT)
Dept: CARDIAC SURGERY | Facility: CLINIC | Age: 62
End: 2022-04-07

## 2022-04-21 DIAGNOSIS — I71.20 THORACIC AORTIC ANEURYSM WITHOUT RUPTURE: Primary | ICD-10-CM

## 2022-06-02 ENCOUNTER — TELEPHONE (OUTPATIENT)
Dept: CARDIAC SURGERY | Facility: CLINIC | Age: 62
End: 2022-06-02

## 2022-06-02 ENCOUNTER — OFFICE VISIT (OUTPATIENT)
Dept: CARDIAC SURGERY | Facility: CLINIC | Age: 62
End: 2022-06-02

## 2022-06-02 DIAGNOSIS — I71.20 THORACIC AORTIC ANEURYSM WITHOUT RUPTURE: Primary | ICD-10-CM

## 2022-06-02 PROCEDURE — 99442 PR PHYS/QHP TELEPHONE EVALUATION 11-20 MIN: CPT | Performed by: NURSE PRACTITIONER

## 2022-06-02 RX ORDER — BUDESONIDE, GLYCOPYRROLATE, AND FORMOTEROL FUMARATE 160; 9; 4.8 UG/1; UG/1; UG/1
AEROSOL, METERED RESPIRATORY (INHALATION)
COMMUNITY
Start: 2022-05-27 | End: 2022-06-02

## 2022-06-02 NOTE — PROGRESS NOTES
Telehealth E-Visit      Patient Name: Victor M Mao  : 1960   MRN: [id number]     The patient has consented to a Telehealth Visit.     Chief Complaint:    Chief Complaint   Patient presents with   • Follow-up     1 yr fu with CT Chest        History of Present Illness:   Victor M Mao is a 62 y.o. female former smoker with history of COPD, HTN, HLD on statin therapy, non-insulin-dependent DM, lung nodule, and ascending TAA being followed by Dr. Zurita since 2019.  Lung nodule found during COPD exacerbation and treatment for pneumonia.  This 2019 imaging had incidental finding of 4.1-4.2 cm ascending aneurysm as well.  She was last seen in clinic 2021 by Kenia CAPUTO with stable aneurysm and plans for continued annual surveillance. She denies any unusual chest, back, or upper scapular pain reports good blood pressure control.  She has no constitutional symptoms or respiratory complaints.  She is coming up on her 1 year anniversary of smoking cessation and has had improvement in her exertional dyspnea.  She continues to be followed by her pulmonologist in [place] Dr. Treviño.    Subjective      Review of Systems:   Review of Systems   Constitutional: Negative for chills, decreased appetite, diaphoresis, fever, malaise/fatigue, night sweats, weight gain and weight loss.   HENT: Negative for hoarse voice.    Eyes: Negative for blurred vision, double vision and visual disturbance.   Cardiovascular: Negative for chest pain, claudication, dyspnea on exertion, irregular heartbeat, leg swelling, near-syncope, orthopnea, palpitations, paroxysmal nocturnal dyspnea and syncope.   Respiratory: Negative for cough, hemoptysis, shortness of breath, sputum production and wheezing.    Hematologic/Lymphatic: Negative for adenopathy and bleeding problem. Does not bruise/bleed easily.   Skin: Negative for color change, nail changes, poor wound healing and rash.   Musculoskeletal: Negative for back pain, falls and muscle  cramps.   Gastrointestinal: Negative for abdominal pain, dysphagia and heartburn.   Genitourinary: Negative for flank pain.   Neurological: Negative for brief paralysis, disturbances in coordination, dizziness, focal weakness, headaches, light-headedness, loss of balance, numbness, paresthesias, sensory change, vertigo and weakness.   Psychiatric/Behavioral: Negative for depression and suicidal ideas.   Allergic/Immunologic: Negative for persistent infections.       I have reviewed the following portions of the patient's history: allergies, current medications, past family history, past medical history, past social history, past surgical history and problem list and confirm it's accurate.    Medications:     Current Outpatient Medications:   •  albuterol (PROVENTIL) (2.5 MG/3ML) 0.083% nebulizer solution, Take 2.5 mg by nebulization Every 4 (Four) Hours As Needed for Wheezing., Disp: , Rfl:   •  albuterol sulfate  (90 Base) MCG/ACT inhaler, Inhale 2 puffs Every 4 (Four) Hours As Needed for Wheezing., Disp: , Rfl:   •  ALLERGY RELIEF 10 MG tablet, Take 10 mg by mouth Daily., Disp: , Rfl: 1  •  ARIPiprazole (ABILIFY) 5 MG tablet, Take 5 mg by mouth Daily., Disp: , Rfl:   •  atorvastatin (LIPITOR) 20 MG tablet, Take 20 mg by mouth Daily., Disp: , Rfl:   •  Cholecalciferol (Vitamin D3) 50 MCG (2000 UT) tablet, Take 2,000 Units by mouth Daily., Disp: , Rfl:   •  escitalopram (LEXAPRO) 10 MG tablet, Take 10 mg by mouth Daily., Disp: , Rfl:   •  hydroCHLOROthiazide (HYDRODIURIL) 12.5 MG tablet, Take 12.5 mg by mouth Every Morning., Disp: , Rfl:   •  ibuprofen (ADVIL,MOTRIN) 200 MG tablet, Take 400 mg by mouth Every 6 (Six) Hours As Needed for Mild Pain ., Disp: , Rfl:   •  losartan (COZAAR) 25 MG tablet, Take 25 mg by mouth Daily., Disp: , Rfl:   •  meloxicam (MOBIC) 15 MG tablet, Take 15 mg by mouth Daily., Disp: , Rfl: 1  •  metFORMIN ER (GLUCOPHAGE-XR) 500 MG 24 hr tablet, Take 500 mg by mouth Daily., Disp: ,  Rfl:   •  O2 (OXYGEN), Inhale 2 L/min Every Night., Disp: , Rfl:   •  omeprazole (priLOSEC) 20 MG capsule, Take 20 mg by mouth Daily., Disp: , Rfl:     Allergies:   No Known Allergies    Objective     Physical Exam: CR via telehealth   Vital Signs: There were no vitals filed for this visit.  There is no height or weight on file to calculate BMI.    Physical Exam    Imaging/Labs:  LDCT lung screen ([facility])-2022  4.1 cm ectasia of the ascending thoracic aorta.  Lung RADS category 1.  A 12-month follow-up low-dose CT is recommended.  Coarse atherosclerotic plaque formation throughout the left coronary arteries    CTA chest ([facility])-1/15/2021  No significant change in the 4.1 cm ectasia of the ascending thoracic aorta.  Mild left basal atelectasis.  Diffuse atherosclerotic plaque formation throughout the coronary arteries, dilation of the central pulmonary artery which can be seen with pulmonary arterial hypertension    US aorta abdominal (the imaging center [place])- 2019  No evidence of abdominal aortic aneurysm.  Normal study.    Assessment / Plan      Assessment/Plan:  Diagnoses and all orders for this visit:    1. Thoracic aortic aneurysm without rupture (HCC) (Primary)       · Ascending TAA being followed by Dr. Zurita since 2019.    · 2019 imaging with 4.1-4.2cm ascending TAA  · Positive family hx with TAA in father ( during scheduled repair)  · AAA screening negative   · Asymptomatic with good blood pressure control  · Continue annual surveillance  · Lung nodule found during COPD exacerbation and treatment for pneumonia in 2019  · Following with pulmonologist in [place] Dr. Treviño.   · LDCT RADS category 1  · 1 year anniversary of smoking cessation coming up      Follow Up:   Return in about 1 year (around 2023) for Imaging next visit: CT Chest or LDCT .  Or sooner for any further concerns or worsening sign and symptoms. If unable to reach us in the office please dial  [phone] or go to the nearest emergency department.    This visit has been rescheduled as a phone visit to comply with patient safety concerns in accordance with CDC recommendations. Total time of discussion was 16 minutes.     HARSHAL Almonte  [facility]oth[facility]acic [facility]

## 2022-06-02 NOTE — TELEPHONE ENCOUNTER
LEFT VOICEMAIL FOR PATIENT TO GET CHECKED IN FOR HER TELEPHONE VISIT AT 900AM WITH JUVENAL BENNETT       PLEASE LET OFFICE KNOW IF PATIENT CALLS BACK

## 2022-09-28 ENCOUNTER — TELEPHONE (OUTPATIENT)
Dept: CARDIOLOGY | Facility: CLINIC | Age: 62
End: 2022-09-28

## 2022-10-03 NOTE — TELEPHONE ENCOUNTER
No answer at 341 number in chart. 10-3-2022 8:43 am. AND 5:40 PM. L/M  NUMBER TO CALL THE OFFICE BACK. PH,HEATHERN

## 2022-10-04 NOTE — TELEPHONE ENCOUNTER
Pt called FO line stating that she has missed several calls from our office, I stated that it looks like several staff have attempted to reach her and asked for updated contact info:    She stated if before 4:30pm to call her work #: [phone]  If after 4:30pm, can try her usual #[phone]. Does state she's having issues w/ that number.     I informed pt of the message from Dr. Jeffers, but it has been over 1yr since last seen and she currently has no F/U scheduled. I advised pt that we will need to schedule he for a follow up appointment. She stated that she also has a surgery coming up and needs clearance, I explained that we will have to see her prior to being able to clear her, but to have that surgeon fax us a clearance request.  Pt was agreeable to seeing any of our providers. I scheduled her for an opening w/ NB later this month.

## 2022-10-27 ENCOUNTER — OFFICE VISIT (OUTPATIENT)
Dept: CARDIOLOGY | Facility: CLINIC | Age: 62
End: 2022-10-27

## 2022-10-27 VITALS
SYSTOLIC BLOOD PRESSURE: 111 MMHG | HEART RATE: 79 BPM | WEIGHT: 167 LBS | BODY MASS INDEX: 30.73 KG/M2 | HEIGHT: 62 IN | DIASTOLIC BLOOD PRESSURE: 83 MMHG | OXYGEN SATURATION: 95 %

## 2022-10-27 DIAGNOSIS — I10 ESSENTIAL HYPERTENSION: ICD-10-CM

## 2022-10-27 DIAGNOSIS — I47.1 PAROXYSMAL SVT (SUPRAVENTRICULAR TACHYCARDIA): Primary | ICD-10-CM

## 2022-10-27 DIAGNOSIS — I71.20 THORACIC AORTIC ANEURYSM WITHOUT RUPTURE, UNSPECIFIED PART: ICD-10-CM

## 2022-10-27 PROBLEM — I47.10 PAROXYSMAL SVT (SUPRAVENTRICULAR TACHYCARDIA): Status: ACTIVE | Noted: 2022-10-27

## 2022-10-27 PROCEDURE — 99214 OFFICE O/P EST MOD 30 MIN: CPT | Performed by: PHYSICIAN ASSISTANT

## 2022-10-27 RX ORDER — BUDESONIDE, GLYCOPYRROLATE, AND FORMOTEROL FUMARATE 160; 9; 4.8 UG/1; UG/1; UG/1
AEROSOL, METERED RESPIRATORY (INHALATION)
COMMUNITY
Start: 2022-09-26

## 2022-10-27 RX ORDER — BUSPIRONE HYDROCHLORIDE 5 MG/1
5 TABLET ORAL 3 TIMES DAILY PRN
COMMUNITY
Start: 2022-10-13

## 2022-10-27 NOTE — PROGRESS NOTES
Problem list     Subjective   Victor M Mao is a 62 y.o. female     Chief Complaint   Patient presents with   • Palpitations     LCR f/u   • Medical Clearance     Cataract surgery by Dr Lock   • Shortness of Breath   Problem list  1.  Ascending aortic aneurysm  1.1 last estimation in 2021 and 4.1 to 4.2 cm followed by Dr. Zurita at [facility]  2.  Preserved systolic function  3.  Palpitations  3.1 event monitor 2021 with PACs and SVT  4.  Hypertension    HPI    Patient is a 62-year-old female who presents to the office to be evaluated.  She recently went to the ER feeling her heart beating irregular and by review of EKG, she had frequent PACs.  She has since seen primary and had Holter monitor which she turned in last Friday.  I currently do not have the record.    She does not describe any chest pain or pressure.  She has mild dyspnea but this is stable.  She does not describe progressive shortness of breath at all.  No complaints of PND or orthopnea.    She does palpitate at times but it is manageable.  She does not describe any rapid heartbeats.  She does not describe any presyncope or syncope.  She feels stable otherwise and is doing well      Current Outpatient Medications on File Prior to Visit   Medication Sig Dispense Refill   • albuterol (PROVENTIL) (2.5 MG/3ML) 0.083% nebulizer solution Take 2.5 mg by nebulization Every 4 (Four) Hours As Needed for Wheezing.     • albuterol sulfate  (90 Base) MCG/ACT inhaler Inhale 2 puffs Every 4 (Four) Hours As Needed for Wheezing.     • ALLERGY RELIEF 10 MG tablet Take 10 mg by mouth Daily.  1   • ARIPiprazole (ABILIFY) 5 MG tablet Take 5 mg by mouth Daily.     • atorvastatin (LIPITOR) 20 MG tablet Take 20 mg by mouth Daily.     • Breztri Aerosphere 160-9-4.8 MCG/ACT aerosol inhaler USE 2 PUFFS EVERY 12 HOURS     • busPIRone (BUSPAR) 5 MG tablet Take 1 tablet by mouth 3 (Three) Times a Day As Needed.     • Cholecalciferol (Vitamin D3) 50 MCG (2000  UT) tablet Take 2,000 Units by mouth Daily.     • hydroCHLOROthiazide (HYDRODIURIL) 25 MG tablet Take 1 tablet by mouth Every Morning.     • ibuprofen (ADVIL,MOTRIN) 200 MG tablet Take 400 mg by mouth Every 6 (Six) Hours As Needed for Mild Pain .     • losartan (COZAAR) 25 MG tablet Take 25 mg by mouth Daily.     • meloxicam (MOBIC) 15 MG tablet Take 15 mg by mouth Daily.  1   • metFORMIN ER (GLUCOPHAGE-XR) 500 MG 24 hr tablet Take 500 mg by mouth Daily.     • O2 (OXYGEN) Inhale 2 L/min Every Night.     • omeprazole (priLOSEC) 20 MG capsule Take 20 mg by mouth Daily.     • [DISCONTINUED] escitalopram (LEXAPRO) 10 MG tablet Take 10 mg by mouth Daily.       No current facility-administered medications on file prior to visit.       Patient has no known allergies.    Past Medical History:   Diagnosis Date   • Anxiety    • Arthritis    • Chronic kidney disease    • COPD (chronic obstructive pulmonary disease) (HCC)    • Degenerative disc disease, cervical    • Depression    • Diabetes mellitus (HCC)    • GERD (gastroesophageal reflux disease)    • HTN (hypertension)    • Hyperlipidemia    • Lung nodule    • Palpitation    • Thoracic aortic aneurysm        Social History     Socioeconomic History   • Marital status: Unknown   • Number of children: 1   Tobacco Use   • Smoking status: Former     Packs/day: 0.25     Years: 34.00     Pack years: 8.50     Types: Cigarettes     Quit date: 2021     Years since quittin.3   • Smokeless tobacco: Never   • Tobacco comments:     smokes 10 cigs a day or less   Substance and Sexual Activity   • Alcohol use: Yes     Comment: very seldom   • Drug use: Yes     Types: Marijuana       Family History   Problem Relation Age of Onset   • Coronary artery disease Mother    • Heart attack Mother    • Aneurysm Father         aortic arch aneurysm   • Heart attack Brother    • Coronary artery disease Brother        Review of Systems   Constitutional: Negative.  Negative for chills,  "diaphoresis, fatigue and fever.   HENT: Negative.    Eyes: Positive for visual disturbance (cataracts).   Respiratory: Positive for apnea (02 2L) and shortness of breath. Negative for cough, chest tightness and wheezing.    Cardiovascular: Positive for palpitations. Negative for chest pain and leg swelling.   Gastrointestinal: Negative.  Negative for abdominal pain, blood in stool, constipation, diarrhea, nausea and vomiting.   Endocrine: Negative.    Genitourinary: Negative.  Negative for hematuria.   Musculoskeletal: Positive for arthralgias, back pain, myalgias and neck pain.   Skin: Negative.    Allergic/Immunologic: Negative.    Neurological: Positive for numbness (legs from DDD). Negative for dizziness, syncope, weakness, light-headedness and headaches.   Hematological: Negative.  Does not bruise/bleed easily.   Psychiatric/Behavioral: Negative for sleep disturbance (up and down).       Objective   Vitals:    10/27/22 1528   BP: 111/83   BP Location: Left arm   Patient Position: Sitting   Pulse: 79   SpO2: 95%   Weight: 75.8 kg (167 lb)   Height: 157.5 cm (62\")      /83 (BP Location: Left arm, Patient Position: Sitting)   Pulse 79   Ht 157.5 cm (62\")   Wt 75.8 kg (167 lb)   SpO2 95%   BMI 30.54 kg/m²     Lab Results (most recent)     None          Physical Exam  Vitals and nursing note reviewed.   Constitutional:       General: She is not in acute distress.     Appearance: Normal appearance. She is well-developed.   HENT:      Head: Normocephalic and atraumatic.   Eyes:      General: No scleral icterus.        Right eye: No discharge.         Left eye: No discharge.      Conjunctiva/sclera: Conjunctivae normal.   Neck:      Vascular: No carotid bruit.   Cardiovascular:      Rate and Rhythm: Normal rate and regular rhythm.      Heart sounds: Normal heart sounds. No murmur heard.    No friction rub. No gallop.      Comments: Grade 1/6 systolic murmur right upper sternal border  Pulmonary:      Effort: " Pulmonary effort is normal. No respiratory distress.      Breath sounds: Normal breath sounds. No wheezing or rales.   Chest:      Chest wall: No tenderness.   Musculoskeletal:      Right lower leg: No edema.      Left lower leg: No edema.   Skin:     General: Skin is warm and dry.      Coloration: Skin is not pale.      Findings: No erythema or rash.   Neurological:      Mental Status: She is alert and oriented to person, place, and time.      Cranial Nerves: No cranial nerve deficit.   Psychiatric:         Behavior: Behavior normal.         Procedure   Procedures       Assessment & Plan     Problems Addressed this Visit        Cardiac and Vasculature    Thoracic aortic aneurysm without rupture    Essential hypertension    Paroxysmal SVT (supraventricular tachycardia) (HCC) - Primary   Diagnoses       Codes Comments    Paroxysmal SVT (supraventricular tachycardia) (HCC)    -  Primary ICD-10-CM: I47.1  ICD-9-CM: 427.0     Thoracic aortic aneurysm without rupture, unspecified part     ICD-10-CM: I71.20  ICD-9-CM: 441.2     Essential hypertension     ICD-10-CM: I10  ICD-9-CM: 401.9         Recommendation  1.  Patient is a 62-year-old female that is doing well.  She has no angina.  She has very minimal to mild dyspnea when exerting but nothing that has been progressive.  Overall she feels well.  For now, we will monitor.  She has good LV function and no symptoms of ischemia.    2.  Patient with thoracic aortic aneurysm being followed by CT surgery.  For now, we will defer to them    3.  Patient with SVT and recent hospitalization with palpitations.  I would like to obtain Holter monitor results.  Once we can review it, we can make determination on medication although she does not describe any severe symptoms of palpitations since that visit.    4.  Patient's blood pressure is well controlled on current medical regimen.  We will see her back for follow-up and someone will call patient once we receive the monitor result.   She is to follow with primary as scheduled.             Victor M Mao  reports that she quit smoking about 15 months ago. Her smoking use included cigarettes. She has a 8.50 pack-year smoking history. She has never used smokeless tobacco..  Electronically signed by:

## 2022-11-02 ENCOUNTER — TELEPHONE (OUTPATIENT)
Dept: CARDIOLOGY | Facility: CLINIC | Age: 62
End: 2022-11-02

## 2022-11-02 NOTE — TELEPHONE ENCOUNTER
Received cardiac clearance request from  stating pt has cataract surgery scheduled for 11/08/2022 and is requiring a cardiac clearance. Placed cardiac clearance request in Karine's inbox to review and address with provider.

## 2022-11-23 ENCOUNTER — TELEPHONE (OUTPATIENT)
Dept: CARDIOLOGY | Facility: CLINIC | Age: 62
End: 2022-11-23

## 2022-11-23 NOTE — TELEPHONE ENCOUNTER
Rupal w/ PCP's office called stating they sent holter and CT ABD/Pelv results for Sebastien to review and see if he wants her in sooner. I explained that he is out of office, but that I can send a message to his assistant and see if she knows anything about this issue and we can either address it upon his return or address it w/ a different provider. She verbalized understanding and requested we call her back directly in regards to getting pt in sooner.     Rupal [phone] ext [phone]

## 2022-11-28 RX ORDER — METOPROLOL SUCCINATE 25 MG/1
25 TABLET, EXTENDED RELEASE ORAL DAILY
Qty: 30 TABLET | Refills: 4 | Status: SHIPPED | OUTPATIENT
Start: 2022-11-28 | End: 2023-03-27

## 2022-11-28 NOTE — TELEPHONE ENCOUNTER
Spoke with patient, states she does feel her heart skip at times. Per Sebastien Mistry PA-C patient to stop Losartan, start Metoprolol 25 mg daily. Patient to call back in one week with blood pressure readings and symptom check. Patient verbalized understanding, metoprolol sent to pharmacy. Karine WADE

## 2022-12-08 ENCOUNTER — TELEPHONE (OUTPATIENT)
Dept: CARDIOLOGY | Facility: CLINIC | Age: 62
End: 2022-12-08

## 2022-12-08 NOTE — TELEPHONE ENCOUNTER
Patient left message that she has been doing well since starting Metoprolol. Blood pressure staying around 118/84. She was advised to keep a log and call office.

## 2023-03-27 RX ORDER — METOPROLOL SUCCINATE 25 MG/1
TABLET, EXTENDED RELEASE ORAL
Qty: 30 TABLET | Refills: 4 | Status: SHIPPED | OUTPATIENT
Start: 2023-03-27

## 2023-03-29 ENCOUNTER — OFFICE VISIT (OUTPATIENT)
Dept: CARDIOLOGY | Facility: CLINIC | Age: 63
End: 2023-03-29
Payer: MEDICAID

## 2023-03-29 VITALS
OXYGEN SATURATION: 93 % | SYSTOLIC BLOOD PRESSURE: 105 MMHG | HEART RATE: 81 BPM | DIASTOLIC BLOOD PRESSURE: 68 MMHG | BODY MASS INDEX: 26.72 KG/M2 | WEIGHT: 145.2 LBS | HEIGHT: 62 IN

## 2023-03-29 DIAGNOSIS — I47.1 PAROXYSMAL SVT (SUPRAVENTRICULAR TACHYCARDIA): Primary | ICD-10-CM

## 2023-03-29 DIAGNOSIS — E78.2 MIXED HYPERLIPIDEMIA: ICD-10-CM

## 2023-03-29 DIAGNOSIS — I10 ESSENTIAL HYPERTENSION: ICD-10-CM

## 2023-03-29 PROCEDURE — 3074F SYST BP LT 130 MM HG: CPT | Performed by: PHYSICIAN ASSISTANT

## 2023-03-29 PROCEDURE — 99213 OFFICE O/P EST LOW 20 MIN: CPT | Performed by: PHYSICIAN ASSISTANT

## 2023-03-29 PROCEDURE — 3078F DIAST BP <80 MM HG: CPT | Performed by: PHYSICIAN ASSISTANT

## 2023-03-29 RX ORDER — LUBIPROSTONE 8 UG/1
CAPSULE, GELATIN COATED ORAL
COMMUNITY
Start: 2023-03-01

## 2023-03-29 NOTE — PROGRESS NOTES
Problem list     Subjective   Victor M Mao is a 63 y.o. female     Chief Complaint   Patient presents with   • Follow-up     5 MTH F/U    Problem list  1.  Ascending aortic aneurysm  1.1 last estimation in 2021 and 4.1 to 4.2 cm followed by Dr. Zurita at [facility]  2.  Preserved systolic function  3.  Palpitations  3.1 event monitor 2021 with PACs and SVT  4.  Hypertension    HPI    Patient is a 63-year-old female who presents back to the office for routine follow-up.  Patient has done well.  She has no chest pain or pressure.  She has no shortness of breath.  No complaints of PND orthopnea.    She will experience occasional palpitations.  Overall, she has done relatively well.  She does not describe dizziness, presyncope or syncope.  She has felt well.      Current Outpatient Medications on File Prior to Visit   Medication Sig Dispense Refill   • ALLERGY RELIEF 10 MG tablet Take 1 tablet by mouth Daily.  1   • Amitiza 8 MCG capsule ONE TABLET ORALLY DAILY 30 DAYS     • ARIPiprazole (ABILIFY) 5 MG tablet Take 1 tablet by mouth Daily.     • atorvastatin (LIPITOR) 20 MG tablet Take 1 tablet by mouth Daily.     • Breztri Aerosphere 160-9-4.8 MCG/ACT aerosol inhaler USE 2 PUFFS EVERY 12 HOURS     • busPIRone (BUSPAR) 5 MG tablet Take 1 tablet by mouth 3 (Three) Times a Day As Needed.     • Cholecalciferol (Vitamin D3) 50 MCG (2000 UT) tablet Take 1 tablet by mouth Daily.     • hydroCHLOROthiazide (HYDRODIURIL) 25 MG tablet Take 1 tablet by mouth Every Morning.     • ibuprofen (ADVIL,MOTRIN) 200 MG tablet Take 2 tablets by mouth Every 6 (Six) Hours As Needed for Mild Pain.     • meloxicam (MOBIC) 15 MG tablet Take 1 tablet by mouth Daily.  1   • metFORMIN ER (GLUCOPHAGE-XR) 500 MG 24 hr tablet Take 1 tablet by mouth Daily.     • metoprolol succinate XL (TOPROL-XL) 25 MG 24 hr tablet TAKE 1 TABLET DAILY 30 tablet 4   • O2 (OXYGEN) Inhale 2 L/min Every Night.     • omeprazole (priLOSEC) 20 MG capsule Take 1  capsule by mouth Daily.     • albuterol (PROVENTIL) (2.5 MG/3ML) 0.083% nebulizer solution Take 2.5 mg by nebulization Every 4 (Four) Hours As Needed for Wheezing.     • albuterol sulfate  (90 Base) MCG/ACT inhaler Inhale 2 puffs Every 4 (Four) Hours As Needed for Wheezing.       No current facility-administered medications on file prior to visit.       Patient has no known allergies.    Past Medical History:   Diagnosis Date   • Anxiety    • Arthritis    • Chronic kidney disease    • COPD (chronic obstructive pulmonary disease) ([facility])    • Degenerative disc disease, cervical    • Depression    • Diabetes mellitus ([facility])    • GERD (gastroesophageal reflux disease)    • HTN (hypertension)    • Hyperlipidemia    • Lung nodule    • Palpitation    • Thoracic aortic aneurysm        Social History     Socioeconomic History   • Marital status: Unknown   • Number of children: 1   Tobacco Use   • Smoking status: Former     Packs/day: 0.25     Years: 34.00     Pack years: 8.50     Types: Cigarettes     Quit date: 2021     Years since quittin.7   • Smokeless tobacco: Never   • Tobacco comments:     smokes 10 cigs a day or less   Substance and Sexual Activity   • Alcohol use: Yes     Comment: very seldom   • Drug use: Yes     Types: Marijuana       Family History   Problem Relation Age of Onset   • Coronary artery disease Mother    • Heart attack Mother    • Aneurysm Father         aortic arch aneurysm   • Heart attack Brother    • Coronary artery disease Brother        Review of Systems   Constitutional: Negative for appetite change, chills, fatigue and fever.   HENT: Negative.  Negative for ear discharge, ear pain, facial swelling, hearing loss, mouth sores, rhinorrhea, sinus pressure, sinus pain, sneezing and sore throat.    Eyes: Negative for pain, redness, itching and visual disturbance.   Respiratory: Negative for cough, choking, shortness of breath and wheezing.    Cardiovascular: Positive for palpitations.  "Negative for chest pain (pain runs through arm) and leg swelling.   Gastrointestinal: Negative for blood in stool, constipation, diarrhea and nausea.   Genitourinary: Negative.  Negative for difficulty urinating.   Musculoskeletal: Positive for arthralgias, back pain and neck pain.   Skin: Negative for rash and wound.   Neurological: Negative for dizziness, weakness and numbness.   Psychiatric/Behavioral: Positive for sleep disturbance.       Objective   Vitals:    03/29/23 1431   BP: 105/68   Pulse: 81   SpO2: 93%   Weight: 65.9 kg (145 lb 3.2 oz)   Height: 157.5 cm (62.01\")      /68   Pulse 81   Ht 157.5 cm (62.01\")   Wt 65.9 kg (145 lb 3.2 oz)   SpO2 93%   BMI 26.55 kg/m²     Lab Results (most recent)     None          Physical Exam  Vitals and nursing note reviewed.   Constitutional:       General: She is not in acute distress.     Appearance: Normal appearance. She is well-developed.   HENT:      Head: Normocephalic and atraumatic.   Eyes:      General: No scleral icterus.        Right eye: No discharge.         Left eye: No discharge.      Conjunctiva/sclera: Conjunctivae normal.   Neck:      Vascular: No carotid bruit.   Cardiovascular:      Rate and Rhythm: Normal rate and regular rhythm.      Heart sounds: Normal heart sounds. No murmur heard.    No friction rub. No gallop.   Pulmonary:      Effort: Pulmonary effort is normal. No respiratory distress.      Breath sounds: Normal breath sounds. No wheezing or rales.   Chest:      Chest wall: No tenderness.   Musculoskeletal:      Right lower leg: No edema.      Left lower leg: No edema.   Skin:     General: Skin is warm and dry.      Coloration: Skin is not pale.      Findings: No erythema or rash.   Neurological:      Mental Status: She is alert and oriented to person, place, and time.      Cranial Nerves: No cranial nerve deficit.   Psychiatric:         Behavior: Behavior normal.         Procedure   Procedures       Assessment & Plan     Problems " Addressed this Visit    None  Diagnoses    None.         Recommendation  1.  Patient is a 63-year-old female who presents back for routine follow-up doing remarkably well.  She has an ascending aortic aneurysm and is followed by CT surgery.  She has appointment in June.  For now, we will defer to them in regards to routine surveillance of the aneurysm.    2.  Patient's blood pressure stable on current medical regimen.  I will continue at this time.    3.  Patient has history of PACs and nonmalignant arrhythmias.  She has done well on beta-blocker therapy.  We will continue at this time.  She does not describe any symptoms of cerebral Bolick events.    4.  Otherwise, patient feeling well.  We will see her back for follow-up in a year or sooner as symptoms discussed.  Follow-up with primary as scheduled.       Electronically signed by:

## 2023-05-01 DIAGNOSIS — I71.21 ANEURYSM OF ASCENDING AORTA WITHOUT RUPTURE: Primary | ICD-10-CM

## 2023-06-07 DIAGNOSIS — I71.21 ANEURYSM OF ASCENDING AORTA WITHOUT RUPTURE: ICD-10-CM

## 2023-08-22 RX ORDER — METOPROLOL SUCCINATE 25 MG/1
TABLET, EXTENDED RELEASE ORAL
Qty: 30 TABLET | Refills: 4 | Status: SHIPPED | OUTPATIENT
Start: 2023-08-22

## 2024-01-17 DIAGNOSIS — I47.10 PAROXYSMAL SVT (SUPRAVENTRICULAR TACHYCARDIA): Primary | ICD-10-CM

## 2024-01-17 NOTE — TELEPHONE ENCOUNTER
Name from pharmacy: METOPROLOL ER 25MG TABLET 25 Tablet          Will file in chart as: metoprolol succinate XL (TOPROL-XL) 25 MG 24 hr tablet    Sig: TAKE 1 TABLET DAILY    Disp: 30 tablet    Refills: 4    Start: 1/17/2024    Class: Normal    Non-formulary    Last ordered: 4 months ago (8/22/2023) by ROMEO Colindres    Last refill: 1/17/2024    Rx #: [id number]    Beta-Blockers Protocol [id number]/17/2024 03:55 PM   Protocol Details Recent or future visit with authorizing provider    No active pregnancy on record    No positive pregnancy test in past 12 months    BP under 130/80 in past year and patient has diabetes, CAD or PVD

## 2024-01-18 RX ORDER — METOPROLOL SUCCINATE 25 MG/1
TABLET, EXTENDED RELEASE ORAL
Qty: 30 TABLET | Refills: 5 | Status: SHIPPED | OUTPATIENT
Start: 2024-01-18

## 2024-04-01 ENCOUNTER — OFFICE VISIT (OUTPATIENT)
Dept: CARDIOLOGY | Facility: CLINIC | Age: 64
End: 2024-04-01
Payer: MEDICARE

## 2024-04-01 VITALS
OXYGEN SATURATION: 96 % | BODY MASS INDEX: 25.4 KG/M2 | HEART RATE: 69 BPM | WEIGHT: 138 LBS | SYSTOLIC BLOOD PRESSURE: 108 MMHG | DIASTOLIC BLOOD PRESSURE: 74 MMHG | HEIGHT: 62 IN

## 2024-04-01 DIAGNOSIS — I10 ESSENTIAL HYPERTENSION: ICD-10-CM

## 2024-04-01 DIAGNOSIS — I47.10 PAROXYSMAL SVT (SUPRAVENTRICULAR TACHYCARDIA): Primary | ICD-10-CM

## 2024-04-01 DIAGNOSIS — I71.20 THORACIC AORTIC ANEURYSM WITHOUT RUPTURE, UNSPECIFIED PART: ICD-10-CM

## 2024-04-01 PROCEDURE — 93000 ELECTROCARDIOGRAM COMPLETE: CPT | Performed by: PHYSICIAN ASSISTANT

## 2024-04-01 NOTE — LETTER
April 1, 2024       No Recipients    Patient: Victor M Mao   YOB: 1960   Date of Visit: 4/1/2024       Dear HARSHAL Moran    Victor M Mao was in my office today. Below is a copy of my note.    If you have questions, please do not hesitate to call me. I look forward to following Victor M along with you.         Sincerely,        ROMEO Mansfield        CC:   No Recipients    Problem list     Subjective  Victor M Mao is a 64 y.o. female     Chief Complaint   Patient presents with   • Follow-up     1 year f/u   Problem list  1.  Ascending aortic aneurysm  1.1 last estimation in 2021 and 4.1 to 4.2 cm followed by Dr. Zurita at [facility]  2.  Preserved systolic function  3.  Palpitations  3.1 event monitor 2021 with PACs and SVT  4.  Hypertension    HPI    Patient is a 64-year-old female presenting back to the office for routine cardiac assessment.  She has clinically done well.  She has no chest pain or pressure.  No shortness of breath.  No complaints of PND or orthopnea.    She does not describe palpitating.  She has done well on medication.  No complaints of dizziness, presyncope, or syncope.  She is stable otherwise.      Current Outpatient Medications on File Prior to Visit   Medication Sig Dispense Refill   • ALLERGY RELIEF 10 MG tablet Take 1 tablet by mouth Daily.  1   • atorvastatin (LIPITOR) 20 MG tablet Take 1 tablet by mouth Daily.     • Breztri Aerosphere 160-9-4.8 MCG/ACT aerosol inhaler USE 2 PUFFS EVERY 12 HOURS     • Cholecalciferol (Vitamin D3) 50 MCG (2000 UT) tablet Take 1 tablet by mouth Daily.     • DOXYCYCLINE CALCIUM PO Take  by mouth 2 (Two) Times a Day.     • hydroCHLOROthiazide (HYDRODIURIL) 25 MG tablet Take 1 tablet by mouth Every Morning.     • meloxicam (MOBIC) 15 MG tablet Take 1 tablet by mouth Daily.  1   • metFORMIN ER (GLUCOPHAGE-XR) 500 MG 24 hr tablet Take 1 tablet by mouth Daily.     • metoprolol succinate XL (TOPROL-XL) 25 MG 24 hr  tablet TAKE 1 TABLET DAILY 30 tablet 5   • O2 (OXYGEN) Inhale 2 L/min Every Night.     • omeprazole (priLOSEC) 20 MG capsule Take 1 capsule by mouth Daily.       No current facility-administered medications on file prior to visit.       Patient has no known allergies.    Past Medical History:   Diagnosis Date   • Anxiety    • Arrhythmia    • Arthritis    • Chronic kidney disease    • COPD (chronic obstructive pulmonary disease)    • Degenerative disc disease, cervical    • Depression    • Diabetes mellitus    • GERD (gastroesophageal reflux disease)    • HTN (hypertension)    • Hyperlipidemia    • Lung nodule    • Palpitation    • Thoracic aortic aneurysm        Social History     Socioeconomic History   • Marital status: Unknown   • Number of children: 1   Tobacco Use   • Smoking status: Former     Current packs/day: 0.00     Average packs/day: 0.3 packs/day for 34.0 years (8.5 ttl pk-yrs)     Types: Cigarettes     Start date: 1987     Quit date: 2021     Years since quittin.7   • Smokeless tobacco: Never   • Tobacco comments:     smokes 10 cigs a day or less   Vaping Use   • Vaping status: Former   • Substances: Nicotine   Substance and Sexual Activity   • Alcohol use: Yes     Comment: very seldom   • Drug use: Yes     Types: Marijuana   • Sexual activity: Defer       Family History   Problem Relation Age of Onset   • Coronary artery disease Mother    • Heart attack Mother    • Aneurysm Father         aortic arch aneurysm   • Heart attack Brother    • Coronary artery disease Brother        Review of Systems   Constitutional: Negative.  Negative for chills, diaphoresis, fatigue and fever.   HENT: Negative.     Eyes: Negative.  Negative for visual disturbance.   Respiratory:  Positive for wheezing. Negative for apnea, cough, chest tightness and shortness of breath.         2L O2 at night   Cardiovascular: Negative.  Negative for chest pain, palpitations and leg swelling.   Gastrointestinal: Negative.   "Negative for blood in stool.   Endocrine: Negative.  Negative for cold intolerance and heat intolerance.   Genitourinary: Negative.  Negative for hematuria.   Musculoskeletal:  Positive for arthralgias, neck pain and neck stiffness. Negative for back pain and myalgias.   Skin: Negative.  Negative for color change, rash and wound.   Allergic/Immunologic: Positive for environmental allergies (seasonal). Negative for food allergies.   Neurological:  Negative for dizziness, syncope, weakness, light-headedness, numbness and headaches.   Hematological: Negative.  Does not bruise/bleed easily.   Psychiatric/Behavioral:  Positive for sleep disturbance (occas. trouble going to sleep).        Objective  Vitals:    04/01/24 1437   BP: 108/74   BP Location: Left arm   Patient Position: Sitting   Pulse: 69   SpO2: 96%   Weight: 62.6 kg (138 lb)   Height: 156.2 cm (61.5\")      /74 (BP Location: Left arm, Patient Position: Sitting)   Pulse 69   Ht 156.2 cm (61.5\")   Wt 62.6 kg (138 lb)   SpO2 96%   BMI 25.65 kg/m²     Lab Results (most recent)       None            Physical Exam  Vitals and nursing note reviewed.   Constitutional:       General: She is not in acute distress.     Appearance: Normal appearance. She is well-developed.   HENT:      Head: Normocephalic and atraumatic.   Eyes:      General: No scleral icterus.        Right eye: No discharge.         Left eye: No discharge.      Conjunctiva/sclera: Conjunctivae normal.   Neck:      Vascular: No carotid bruit.   Cardiovascular:      Rate and Rhythm: Normal rate and regular rhythm.      Heart sounds: Normal heart sounds. No murmur heard.     No friction rub. No gallop.   Pulmonary:      Effort: Pulmonary effort is normal. No respiratory distress.      Breath sounds: Normal breath sounds. No wheezing or rales.   Chest:      Chest wall: No tenderness.   Musculoskeletal:      Right lower leg: No edema.      Left lower leg: No edema.   Skin:     General: Skin is " warm and dry.      Coloration: Skin is not pale.      Findings: No erythema or rash.   Neurological:      Mental Status: She is alert and oriented to person, place, and time.      Cranial Nerves: No cranial nerve deficit.   Psychiatric:         Behavior: Behavior normal.         Procedure    ECG 12 Lead    Date/Time: 4/1/2024 2:46 PM  Performed by: Sebastien Mistry PA    Authorized by: Sebastien Mistry PA  Comparison: compared with previous ECG from 9/23/2022  Comments: EKG demonstrates sinus rhythm at 69 bpm, low voltage, and no acute ST changes             Assessment & Plan    Problems Addressed this Visit          Cardiac and Vasculature    Thoracic aortic aneurysm without rupture    Essential hypertension    Paroxysmal SVT (supraventricular tachycardia) - Primary     Diagnoses         Codes Comments    Paroxysmal SVT (supraventricular tachycardia)    -  Primary ICD-10-CM: I47.10  ICD-9-CM: 427.0     Thoracic aortic aneurysm without rupture, unspecified part     ICD-10-CM: I71.20  ICD-9-CM: 441.2     Essential hypertension     ICD-10-CM: I10  ICD-9-CM: 401.9             Recommendation  1.  Patient is a 64-year-old female with history of SVT doing well.  She has no palpitations or other arrhythmic issues.  She has no chest pain or angina.  For now, patient is stable.  I will make no changes.    2.  Patient with history of aneurysm following with CT surgery.  We will monitor for now.    3.  Patient's blood pressure well-controlled.  We will continue current medical regimen.  For now, she is stable.  She has labs performed by primary.  We will see her back for follow-up in a year or as needed.  Follow-up with primary as scheduled.           Victor M Mao  reports that she quit smoking about 2 years ago. Her smoking use included cigarettes. She started smoking about 36 years ago. She has a 8.5 pack-year smoking history. She has never used smokeless tobacco.     Patient did not bring med list or medicine bottles  to appointment, med list has been reviewed and updated based on patient's knowledge of their meds.     Advance Care Planning  ACP discussion was declined by the patient. Patient does not have an advance directive, declines further assistance.          Electronically signed by:

## 2024-04-01 NOTE — PROGRESS NOTES
Problem list     Subjective   Victor M Mao is a 64 y.o. female     Chief Complaint   Patient presents with    Follow-up     1 year f/u   Problem list  1.  Ascending aortic aneurysm  1.1 last estimation in 2021 and 4.1 to 4.2 cm followed by Dr. Zurita at [facility]  2.  Preserved systolic function  3.  Palpitations  3.1 event monitor 2021 with PACs and SVT  4.  Hypertension    HPI    Patient is a 64-year-old female presenting back to the office for routine cardiac assessment.  She has clinically done well.  She has no chest pain or pressure.  No shortness of breath.  No complaints of PND or orthopnea.    She does not describe palpitating.  She has done well on medication.  No complaints of dizziness, presyncope, or syncope.  She is stable otherwise.      Current Outpatient Medications on File Prior to Visit   Medication Sig Dispense Refill    ALLERGY RELIEF 10 MG tablet Take 1 tablet by mouth Daily.  1    atorvastatin (LIPITOR) 20 MG tablet Take 1 tablet by mouth Daily.      Breztri Aerosphere 160-9-4.8 MCG/ACT aerosol inhaler USE 2 PUFFS EVERY 12 HOURS      Cholecalciferol (Vitamin D3) 50 MCG (2000 UT) tablet Take 1 tablet by mouth Daily.      DOXYCYCLINE CALCIUM PO Take  by mouth 2 (Two) Times a Day.      hydroCHLOROthiazide (HYDRODIURIL) 25 MG tablet Take 1 tablet by mouth Every Morning.      meloxicam (MOBIC) 15 MG tablet Take 1 tablet by mouth Daily.  1    metFORMIN ER (GLUCOPHAGE-XR) 500 MG 24 hr tablet Take 1 tablet by mouth Daily.      metoprolol succinate XL (TOPROL-XL) 25 MG 24 hr tablet TAKE 1 TABLET DAILY 30 tablet 5    O2 (OXYGEN) Inhale 2 L/min Every Night.      omeprazole (priLOSEC) 20 MG capsule Take 1 capsule by mouth Daily.       No current facility-administered medications on file prior to visit.       Patient has no known allergies.    Past Medical History:   Diagnosis Date    Anxiety     Arrhythmia     Arthritis     Chronic kidney disease     COPD (chronic obstructive pulmonary  disease)     Degenerative disc disease, cervical     Depression     Diabetes mellitus     GERD (gastroesophageal reflux disease)     HTN (hypertension)     Hyperlipidemia     Lung nodule     Palpitation     Thoracic aortic aneurysm        Social History     Socioeconomic History    Marital status: Unknown    Number of children: 1   Tobacco Use    Smoking status: Former     Current packs/day: 0.00     Average packs/day: 0.3 packs/day for 34.0 years (8.5 ttl pk-yrs)     Types: Cigarettes     Start date: 1987     Quit date: 2021     Years since quittin.7    Smokeless tobacco: Never    Tobacco comments:     smokes 10 cigs a day or less   Vaping Use    Vaping status: Former    Substances: Nicotine   Substance and Sexual Activity    Alcohol use: Yes     Comment: very seldom    Drug use: Yes     Types: Marijuana    Sexual activity: Defer       Family History   Problem Relation Age of Onset    Coronary artery disease Mother     Heart attack Mother     Aneurysm Father         aortic arch aneurysm    Heart attack Brother     Coronary artery disease Brother        Review of Systems   Constitutional: Negative.  Negative for chills, diaphoresis, fatigue and fever.   HENT: Negative.     Eyes: Negative.  Negative for visual disturbance.   Respiratory:  Positive for wheezing. Negative for apnea, cough, chest tightness and shortness of breath.         2L O2 at night   Cardiovascular: Negative.  Negative for chest pain, palpitations and leg swelling.   Gastrointestinal: Negative.  Negative for blood in stool.   Endocrine: Negative.  Negative for cold intolerance and heat intolerance.   Genitourinary: Negative.  Negative for hematuria.   Musculoskeletal:  Positive for arthralgias, neck pain and neck stiffness. Negative for back pain and myalgias.   Skin: Negative.  Negative for color change, rash and wound.   Allergic/Immunologic: Positive for environmental allergies (seasonal). Negative for food allergies.   Neurological:  " Negative for dizziness, syncope, weakness, light-headedness, numbness and headaches.   Hematological: Negative.  Does not bruise/bleed easily.   Psychiatric/Behavioral:  Positive for sleep disturbance (occas. trouble going to sleep).        Objective   Vitals:    04/01/24 1437   BP: 108/74   BP Location: Left arm   Patient Position: Sitting   Pulse: 69   SpO2: 96%   Weight: 62.6 kg (138 lb)   Height: 156.2 cm (61.5\")      /74 (BP Location: Left arm, Patient Position: Sitting)   Pulse 69   Ht 156.2 cm (61.5\")   Wt 62.6 kg (138 lb)   SpO2 96%   BMI 25.65 kg/m²     Lab Results (most recent)       None            Physical Exam  Vitals and nursing note reviewed.   Constitutional:       General: She is not in acute distress.     Appearance: Normal appearance. She is well-developed.   HENT:      Head: Normocephalic and atraumatic.   Eyes:      General: No scleral icterus.        Right eye: No discharge.         Left eye: No discharge.      Conjunctiva/sclera: Conjunctivae normal.   Neck:      Vascular: No carotid bruit.   Cardiovascular:      Rate and Rhythm: Normal rate and regular rhythm.      Heart sounds: Normal heart sounds. No murmur heard.     No friction rub. No gallop.   Pulmonary:      Effort: Pulmonary effort is normal. No respiratory distress.      Breath sounds: Normal breath sounds. No wheezing or rales.   Chest:      Chest wall: No tenderness.   Musculoskeletal:      Right lower leg: No edema.      Left lower leg: No edema.   Skin:     General: Skin is warm and dry.      Coloration: Skin is not pale.      Findings: No erythema or rash.   Neurological:      Mental Status: She is alert and oriented to person, place, and time.      Cranial Nerves: No cranial nerve deficit.   Psychiatric:         Behavior: Behavior normal.         Procedure     ECG 12 Lead    Date/Time: 4/1/2024 2:46 PM  Performed by: Sebastien Mistry PA    Authorized by: Sebastien Mistry PA  Comparison: compared with previous " ECG from 9/23/2022  Comments: EKG demonstrates sinus rhythm at 69 bpm, low voltage, and no acute ST changes             Assessment & Plan     Problems Addressed this Visit          Cardiac and Vasculature    Thoracic aortic aneurysm without rupture    Essential hypertension    Paroxysmal SVT (supraventricular tachycardia) - Primary     Diagnoses         Codes Comments    Paroxysmal SVT (supraventricular tachycardia)    -  Primary ICD-10-CM: I47.10  ICD-9-CM: 427.0     Thoracic aortic aneurysm without rupture, unspecified part     ICD-10-CM: I71.20  ICD-9-CM: 441.2     Essential hypertension     ICD-10-CM: I10  ICD-9-CM: 401.9             Recommendation  1.  Patient is a 64-year-old female with history of SVT doing well.  She has no palpitations or other arrhythmic issues.  She has no chest pain or angina.  For now, patient is stable.  I will make no changes.    2.  Patient with history of aneurysm following with CT surgery.  We will monitor for now.    3.  Patient's blood pressure well-controlled.  We will continue current medical regimen.  For now, she is stable.  She has labs performed by primary.  We will see her back for follow-up in a year or as needed.  Follow-up with primary as scheduled.           Victor M Mao  reports that she quit smoking about 2 years ago. Her smoking use included cigarettes. She started smoking about 36 years ago. She has a 8.5 pack-year smoking history. She has never used smokeless tobacco.     Patient did not bring med list or medicine bottles to appointment, med list has been reviewed and updated based on patient's knowledge of their meds.     Advance Care Planning   ACP discussion was declined by the patient. Patient does not have an advance directive, declines further assistance.          Electronically signed by:

## 2024-05-22 DIAGNOSIS — I71.20 THORACIC AORTIC ANEURYSM WITHOUT RUPTURE, UNSPECIFIED PART: Primary | ICD-10-CM

## 2024-05-29 DIAGNOSIS — I71.20 THORACIC AORTIC ANEURYSM WITHOUT RUPTURE, UNSPECIFIED PART: Primary | ICD-10-CM

## 2024-08-22 DIAGNOSIS — I47.10 PAROXYSMAL SVT (SUPRAVENTRICULAR TACHYCARDIA): ICD-10-CM

## 2024-08-22 RX ORDER — METOPROLOL SUCCINATE 25 MG/1
25 TABLET, EXTENDED RELEASE ORAL DAILY
Qty: 30 TABLET | Refills: 5 | Status: SHIPPED | OUTPATIENT
Start: 2024-08-22 | End: 2024-08-26 | Stop reason: SDUPTHER

## 2024-08-26 ENCOUNTER — TELEPHONE (OUTPATIENT)
Dept: CARDIOLOGY | Facility: CLINIC | Age: 64
End: 2024-08-26
Payer: MEDICARE

## 2024-08-26 DIAGNOSIS — I47.10 PAROXYSMAL SVT (SUPRAVENTRICULAR TACHYCARDIA): ICD-10-CM

## 2024-08-26 RX ORDER — METOPROLOL SUCCINATE 25 MG/1
25 TABLET, EXTENDED RELEASE ORAL DAILY
Qty: 90 TABLET | Refills: 5 | Status: SHIPPED | OUTPATIENT
Start: 2024-08-26

## 2024-08-26 RX ORDER — METOPROLOL SUCCINATE 25 MG/1
25 TABLET, EXTENDED RELEASE ORAL DAILY
Qty: 90 TABLET | Refills: 5 | Status: SHIPPED | OUTPATIENT
Start: 2024-08-26 | End: 2024-08-26 | Stop reason: SDUPTHER

## 2024-08-28 ENCOUNTER — TELEPHONE (OUTPATIENT)
Dept: CARDIOLOGY | Facility: CLINIC | Age: 64
End: 2024-08-28
Payer: MEDICARE

## 2024-08-28 NOTE — TELEPHONE ENCOUNTER
Pt LVM stating that her px told her Metoprolol RF was denied and that she's been out for 2 days.       I called pt and informed her we sent Rf to [facility], she stated that was the correct px and they told her we denied Rf. I advised pt that I would call and speak w/ px staff ASAP and to head there to P/U.       Called [facility], spoke w/ Gabbie, she stated that we didn't respond to RF request after 5 days so they marked it as denied. I explained that we sent in RF on 8/26 and that she has been without. Stated no one called us to mention an issue and pt needs rx now. She stated she would get filled.

## 2024-09-25 DIAGNOSIS — I71.20 THORACIC AORTIC ANEURYSM WITHOUT RUPTURE, UNSPECIFIED PART: Primary | ICD-10-CM

## 2024-10-29 NOTE — PROGRESS NOTES
[facility] Cardiothoracic Surgery Office Follow Up Note     Date of Encounter: 10/30/2024     Name: Victor M Mao  : 1960     Referred By: No ref. provider found  PCP: Ivonne Aguirre APRN    Chief Complaint:    Chief Complaint   Patient presents with    Thoracic Aneurysm     1 year follow up for a thoracic aneurysm.       Subjective      History of Present Illness:    Victor M Mao is a 64 y.o. female followed by Dr. Zurita for ascending thoracic aortic aneurysm.  PMH: Former tobacco use, COPD, hypertension, hyperlipidemia, type 2 diabetes, lung nodule, family history aneurysmal disease (father), and TAA 4.2 cm.  Followed by vascular surgery since 2019.  Lung nodule followed by pulmonology, Dr. Treviño in [facility].  Patient reports blood pressures remain well-controlled at home.  Current medical therapy includes statin and beta-blocker.      Review of Systems:  Review of Systems   Constitutional: Positive for diaphoresis, malaise/fatigue, night sweats and weight gain. Negative for chills, decreased appetite and fever.   HENT: Negative.  Negative for congestion, hoarse voice and sore throat.    Cardiovascular:  Positive for dyspnea on exertion. Negative for chest pain, irregular heartbeat, leg swelling, near-syncope, orthopnea, palpitations, paroxysmal nocturnal dyspnea and syncope.   Respiratory: Negative.  Negative for cough, hemoptysis, shortness of breath, sleep disturbances due to breathing, snoring, sputum production and wheezing.    Hematologic/Lymphatic: Negative for adenopathy and bleeding problem. Bruises/bleeds easily.   Skin: Negative.  Negative for color change, dry skin, itching, poor wound healing and rash.   Musculoskeletal:  Positive for back pain and joint pain. Negative for falls and muscle weakness.   Gastrointestinal: Negative.  Negative for abdominal pain, anorexia, constipation, diarrhea, nausea and vomiting.   Genitourinary: Negative.  Negative for dysuria and  frequency.   Neurological:  Positive for dizziness. Negative for difficulty with concentration, headaches, numbness, paresthesias, seizures and weakness.   Psychiatric/Behavioral:  Positive for depression. Negative for altered mental status and memory loss. The patient is nervous/anxious. The patient does not have insomnia.    Allergic/Immunologic: Negative.  Negative for persistent infections.       I have reviewed the following portions of the patient's history: problem list, current medications, allergies, past surgical history, past medical history, past social history, past family history, and ROS and confirm it's accurate.    Allergies:  No Known Allergies    Medications:      Current Outpatient Medications:     ALLERGY RELIEF 10 MG tablet, Take 1 tablet by mouth Daily., Disp: , Rfl: 1    atorvastatin (LIPITOR) 20 MG tablet, Take 1 tablet by mouth Daily., Disp: , Rfl:     Breztri Aerosphere 160-9-4.8 MCG/ACT aerosol inhaler, USE 2 PUFFS EVERY 12 HOURS, Disp: , Rfl:     Cholecalciferol (Vitamin D3) 50 MCG (2000 UT) tablet, Take 1 tablet by mouth Daily., Disp: , Rfl:     escitalopram (LEXAPRO) 20 MG tablet, Take 1 tablet by mouth Daily., Disp: , Rfl:     hydroCHLOROthiazide (HYDRODIURIL) 25 MG tablet, Take 1 tablet by mouth Every Morning., Disp: , Rfl:     meloxicam (MOBIC) 15 MG tablet, Take 1 tablet by mouth Daily., Disp: , Rfl: 1    metFORMIN ER (GLUCOPHAGE-XR) 500 MG 24 hr tablet, Take 1 tablet by mouth Daily., Disp: , Rfl:     metoprolol succinate XL (TOPROL-XL) 25 MG 24 hr tablet, Take 1 tablet by mouth Daily., Disp: 90 tablet, Rfl: 5    O2 (OXYGEN), Inhale 2 L/min Every Night., Disp: , Rfl:     omeprazole (priLOSEC) 20 MG capsule, Take 1 capsule by mouth Daily., Disp: , Rfl:     DOXYCYCLINE CALCIUM PO, Take  by mouth 2 (Two) Times a Day. (Patient not taking: Reported on 10/30/2024), Disp: , Rfl:     History:   Past Medical History:   Diagnosis Date    Anxiety     Arrhythmia     Arthritis     Chronic  "kidney disease     COPD (chronic obstructive pulmonary disease)     Degenerative disc disease, cervical     Depression     Diabetes mellitus     GERD (gastroesophageal reflux disease)     HTN (hypertension)     Hyperlipidemia     Lung nodule     Palpitation     Thoracic aortic aneurysm        Past Surgical History:   Procedure Laterality Date    CATARACT EXTRACTION, BILATERAL Bilateral      SECTION      CHOLECYSTECTOMY      COLONOSCOPY W/ POLYPECTOMY      PAROTIDECTOMY      TUBAL ABDOMINAL LIGATION         Social History     Socioeconomic History    Marital status:     Number of children: 1   Tobacco Use    Smoking status: Former     Current packs/day: 0.00     Average packs/day: 0.3 packs/day for 34.0 years (8.5 ttl pk-yrs)     Types: Cigarettes     Start date: 1987     Quit date: 2021     Years since quitting: 3.3    Smokeless tobacco: Never    Tobacco comments:     smokes 10 cigs a day or less   Vaping Use    Vaping status: Former    Substances: Nicotine   Substance and Sexual Activity    Alcohol use: Yes     Comment: very seldom    Drug use: Yes     Types: Marijuana    Sexual activity: Defer        Family History   Problem Relation Age of Onset    Coronary artery disease Mother     Heart attack Mother     Aneurysm Father         aortic arch aneurysm    Heart attack Brother     Coronary artery disease Brother        Objective   Physical Exam:  Vitals:    10/30/24 1217 10/30/24 1218   BP: 122/83 120/82   BP Location: Left arm Right arm   Patient Position: Sitting Sitting   Pulse: 62    Temp: 97.7 °F (36.5 °C)    SpO2: 96%    Weight: 71.2 kg (157 lb)    Height: 154.9 cm (61\")  Comment: patient reports       Body mass index is 29.66 kg/m².    Physical Exam  Vitals reviewed.   Constitutional:       General: She is not in acute distress.     Appearance: She is well-developed and well-groomed. She is not toxic-appearing.   HENT:      Head: Normocephalic and atraumatic.   Eyes:      General: " Lids are normal.      Conjunctiva/sclera: Conjunctivae normal.      Pupils: Pupils are equal, round, and reactive to light.   Neck:      Vascular: No carotid bruit or JVD.   Cardiovascular:      Rate and Rhythm: Normal rate and regular rhythm.      Pulses:           Carotid pulses are 2+ on the right side and 2+ on the left side.       Radial pulses are 2+ on the right side and 2+ on the left side.      Heart sounds: S1 normal and S2 normal. No murmur heard.  Pulmonary:      Effort: Pulmonary effort is normal. No respiratory distress.      Breath sounds: Normal breath sounds.   Musculoskeletal:         General: Normal range of motion.      Cervical back: Normal range of motion and neck supple.      Right lower leg: No edema.      Left lower leg: No edema.   Skin:     General: Skin is warm and dry.      Capillary Refill: Capillary refill takes less than 2 seconds.   Neurological:      General: No focal deficit present.      Mental Status: She is alert and oriented to person, place, and time.      GCS: GCS eye subscore is 4. GCS verbal subscore is 5. GCS motor subscore is 6.   Psychiatric:         Attention and Perception: Attention normal.         Mood and Affect: Mood normal.         Speech: Speech normal.         Behavior: Behavior is cooperative.     Imaging/Labs:  CT chest with and without contrast 10/2/2024 @ [facility] (Personally reviewed and agree w/ ascending aorta 4.2 cm)  Impression:  1.  Stable ectasia of the mid ascending thoracic aorta 3.9 x 4.1 cm  2.  Stable ectasia of the ascending aorta at the sinuses of Valsalva measuring 4.2 cm  3.  Further chronic finding as described  4.  No acute findings.    CT chest w/wo @ [facility] 6/7/2023  Impression:  4.2 cm ectasia of the ascending thoracic aorta previously 4.1.  Advanced atherosclerotic plaque formation throughout the coronary arteries       LDCT lung screen ([facility]1/17/2022  4.1 cm ectasia of the ascending thoracic aorta.  Lung RADS category 1.  A  12-month follow-up low-dose CT is recommended.  Coarse atherosclerotic plaque formation throughout the left coronary arteries     CTA chest ([facility])-1/15/2021  No significant change in the 4.1 cm ectasia of the ascending thoracic aorta.  Mild left basal atelectasis.  Diffuse atherosclerotic plaque formation throughout the coronary arteries, dilation of the central pulmonary artery which can be seen with pulmonary arterial hypertension     US aorta abdominal (the [facility] [place])- 1/31/2019  No evidence of abdominal aortic aneurysm.  Normal study.    Assessment / Plan      Assessment / Plan:  1. Aneurysm of ascending aorta without rupture   - 64 y.o. female followed by Dr. Zurita for ascending thoracic aortic aneurysm.    - PMH: Former tobacco use, COPD, hypertension, hyperlipidemia, type 2 diabetes, lung nodule, family history aneurysmal disease (father), and TAA 4.2 cm.   - Followed by Vascular Surgery since 2019.    - Patient reports blood pressures remain well-controlled at home.  - BP at goal in clinic   - Compliant with statin and beta-blocker.   -CTA imaging demonstrates stable TAA 4.2 cm  -Will plan to continue annual surveillance      Patient Education: Continue to avoid tobacco use.  Continue to maintain strict blood pressure control with goal less than 130/80 mmHg      Follow Up:   Return in about 1 year (around 10/30/2025) for CT chest with and without.   Or sooner for any further concerns or worsening sign and symptoms. If unable to reach us in the office please dial 911 or go to the nearest emergency department.      HARSHAL Barker  [facility] Cardiothoracic Surgery    Time Spent: I spent 25 minutes caring for Victor M on this date of service. This time includes time spent by me in the following activities: preparing for the visit, reviewing tests, obtaining and/or reviewing a separately obtained history, performing a medically appropriate examination and/or evaluation, counseling  and educating the patient/family/caregiver, documenting information in the medical record, independently interpreting results and communicating that information with the patient/family/caregiver, and care coordination.

## 2024-10-30 ENCOUNTER — OFFICE VISIT (OUTPATIENT)
Dept: CARDIAC SURGERY | Facility: CLINIC | Age: 64
End: 2024-10-30
Payer: MEDICARE

## 2024-10-30 VITALS
SYSTOLIC BLOOD PRESSURE: 120 MMHG | BODY MASS INDEX: 29.64 KG/M2 | OXYGEN SATURATION: 96 % | TEMPERATURE: 97.7 F | HEART RATE: 62 BPM | HEIGHT: 61 IN | WEIGHT: 157 LBS | DIASTOLIC BLOOD PRESSURE: 82 MMHG

## 2024-10-30 DIAGNOSIS — I71.21 ANEURYSM OF ASCENDING AORTA WITHOUT RUPTURE: Primary | ICD-10-CM

## 2024-10-30 PROCEDURE — 99213 OFFICE O/P EST LOW 20 MIN: CPT | Performed by: NURSE PRACTITIONER

## 2024-10-30 PROCEDURE — 1159F MED LIST DOCD IN RCRD: CPT | Performed by: NURSE PRACTITIONER

## 2024-10-30 PROCEDURE — 1160F RVW MEDS BY RX/DR IN RCRD: CPT | Performed by: NURSE PRACTITIONER

## 2024-10-30 PROCEDURE — 3079F DIAST BP 80-89 MM HG: CPT | Performed by: NURSE PRACTITIONER

## 2024-10-30 PROCEDURE — 3074F SYST BP LT 130 MM HG: CPT | Performed by: NURSE PRACTITIONER

## 2024-10-30 RX ORDER — ESCITALOPRAM OXALATE 20 MG/1
20 TABLET ORAL DAILY
COMMUNITY
Start: 2024-10-10

## 2025-06-16 ENCOUNTER — OFFICE VISIT (OUTPATIENT)
Dept: CARDIOLOGY | Facility: CLINIC | Age: 65
End: 2025-06-16
Payer: MEDICARE

## 2025-06-16 VITALS
BODY MASS INDEX: 31.18 KG/M2 | DIASTOLIC BLOOD PRESSURE: 103 MMHG | WEIGHT: 165 LBS | OXYGEN SATURATION: 93 % | HEART RATE: 81 BPM | SYSTOLIC BLOOD PRESSURE: 140 MMHG

## 2025-06-16 DIAGNOSIS — I47.10 PAROXYSMAL SVT (SUPRAVENTRICULAR TACHYCARDIA): ICD-10-CM

## 2025-06-16 DIAGNOSIS — I71.20 THORACIC AORTIC ANEURYSM WITHOUT RUPTURE, UNSPECIFIED PART: Primary | ICD-10-CM

## 2025-06-16 DIAGNOSIS — I10 ESSENTIAL HYPERTENSION: ICD-10-CM

## 2025-06-16 PROCEDURE — 99213 OFFICE O/P EST LOW 20 MIN: CPT | Performed by: PHYSICIAN ASSISTANT

## 2025-06-16 PROCEDURE — 3080F DIAST BP >= 90 MM HG: CPT | Performed by: PHYSICIAN ASSISTANT

## 2025-06-16 PROCEDURE — 3077F SYST BP >= 140 MM HG: CPT | Performed by: PHYSICIAN ASSISTANT

## 2025-06-16 RX ORDER — QUETIAPINE FUMARATE 50 MG/1
TABLET, FILM COATED ORAL
COMMUNITY
Start: 2025-06-05

## 2025-06-16 RX ORDER — METOPROLOL SUCCINATE 25 MG/1
25 TABLET, EXTENDED RELEASE ORAL DAILY
Qty: 90 TABLET | Refills: 5 | Status: SHIPPED | OUTPATIENT
Start: 2025-06-16

## 2025-06-16 NOTE — LETTER
June 16, 2025     HARSHAL Moran  [address]  [place] [address]    Patient: Victor M Mao   YOB: 1960   Date of Visit: 6/16/2025       Dear HARSHAL Moran    Victor M Mao was in my office today. Below is a copy of my note.    If you have questions, please do not hesitate to call me. I look forward to following Victor M along with you.         Sincerely,        ROMEO Mansfield        CC: No Recipients    Problem list     Subjective  Victor M Mao is a 65 y.o. female     Chief Complaint   Patient presents with   • Yearly follow up     Hx Paroxysmal SVT (supraventricular tachycardia)     Problem list  1.  Ascending aortic aneurysm  1.1 last estimation in 2021 and 4.1 to 4.2 cm followed by Dr. Zurita at [facility]  2.  Preserved systolic function  3.  Palpitations  3.1 event monitor 2021 with PACs and SVT  4.  Hypertension    HPI    Patient is a 65-year-old female senting to the office to be evaluated.  Clinically, she appears to have done remarkably well.  She does not describe any chest pain or pressure.  She does describe a degree of weight gain and that has contributed, she feels, to her dyspnea.  It apparently is mild when trying to exert or do activity.  She does not describe PND or orthopnea.    She does not describe palpitating nor she complain of dysrhythmic symptoms.  She has been under some amount of stress which she feels is contributing to her blood pressure.  Otherwise, she appears to be doing well.      Current Outpatient Medications on File Prior to Visit   Medication Sig Dispense Refill   • ALLERGY RELIEF 10 MG tablet Take 1 tablet by mouth Daily.  1   • atorvastatin (LIPITOR) 20 MG tablet Take 1 tablet by mouth Daily.     • Breztri Aerosphere 160-9-4.8 MCG/ACT aerosol inhaler USE 2 PUFFS EVERY 12 HOURS     • Cholecalciferol (Vitamin D3) 50 MCG (2000 UT) tablet Take 1 tablet by mouth Daily.     • escitalopram (LEXAPRO) 20 MG tablet Take 1 tablet by  mouth Daily.     • hydroCHLOROthiazide (HYDRODIURIL) 25 MG tablet Take 1 tablet by mouth Every Morning.     • meloxicam (MOBIC) 15 MG tablet Take 1 tablet by mouth Daily.  1   • metFORMIN ER (GLUCOPHAGE-XR) 500 MG 24 hr tablet Take 1 tablet by mouth Daily.     • O2 (OXYGEN) Inhale 2 L/min Every Night.     • omeprazole (priLOSEC) 20 MG capsule Take 1 capsule by mouth Daily.     • QUEtiapine (SEROquel) 50 MG tablet TAKE ONE TABLET BY MOUTH AT BEDTIME ONCE DAILY     • [DISCONTINUED] metoprolol succinate XL (TOPROL-XL) 25 MG 24 hr tablet Take 1 tablet by mouth Daily. 90 tablet 5   • [DISCONTINUED] DOXYCYCLINE CALCIUM PO Take  by mouth 2 (Two) Times a Day. (Patient not taking: Reported on 10/30/2024)       No current facility-administered medications on file prior to visit.       Patient has no known allergies.    Past Medical History:   Diagnosis Date   • Anxiety    • Arrhythmia    • Arthritis    • Chronic kidney disease    • COPD (chronic obstructive pulmonary disease)    • Degenerative disc disease, cervical    • Depression    • Diabetes mellitus    • GERD (gastroesophageal reflux disease)    • HTN (hypertension)    • Hyperlipidemia    • Lung nodule    • Palpitation    • Thoracic aortic aneurysm        Social History     Socioeconomic History   • Marital status:    • Number of children: 1   Tobacco Use   • Smoking status: Former     Current packs/day: 0.00     Average packs/day: 0.3 packs/day for 34.0 years (8.5 ttl pk-yrs)     Types: Cigarettes     Start date: 7/4/1987     Quit date: 7/4/2021     Years since quitting: 3.9   • Smokeless tobacco: Never   • Tobacco comments:     smokes 10 cigs a day or less   Vaping Use   • Vaping status: Former   • Substances: Nicotine   Substance and Sexual Activity   • Alcohol use: Yes     Comment: very seldom   • Drug use: Yes     Types: Marijuana   • Sexual activity: Defer       Family History   Problem Relation Age of Onset   • Coronary artery disease Mother    • Heart  attack Mother    • Aneurysm Father         aortic arch aneurysm   • Heart attack Brother    • Coronary artery disease Brother        Review of Systems   Constitutional:  Negative for activity change, appetite change, chills, fatigue and fever.   HENT: Negative.  Negative for congestion, sinus pressure and sinus pain.    Eyes: Negative.  Negative for visual disturbance.   Respiratory:  Positive for shortness of breath (w/ exertion). Negative for apnea, cough, chest tightness and wheezing.    Cardiovascular: Negative.  Negative for chest pain, palpitations and leg swelling.   Gastrointestinal: Negative.  Negative for blood in stool.   Endocrine: Negative.  Negative for cold intolerance and heat intolerance.   Genitourinary: Negative.  Negative for hematuria.   Musculoskeletal: Negative.  Negative for gait problem.   Skin: Negative.  Negative for color change, rash and wound.   Allergic/Immunologic: Negative.  Negative for environmental allergies and food allergies.   Neurological:  Negative for dizziness, syncope, weakness, light-headedness, numbness and headaches.   Hematological:  Bruises/bleeds easily.   Psychiatric/Behavioral: Negative.  Negative for sleep disturbance.        Objective  Vitals:    06/16/25 1447   BP: (!) 140/103   BP Location: Left arm   Patient Position: Sitting   Cuff Size: Adult   Pulse: 81   SpO2: 93%   Weight: 74.8 kg (165 lb)      BP (!) 140/103 (BP Location: Left arm, Patient Position: Sitting, Cuff Size: Adult)   Pulse 81   Wt 74.8 kg (165 lb)   SpO2 93%   BMI 31.18 kg/m²     Lab Results (most recent)       None            Physical Exam    Procedure  Procedures       Assessment & Plan    Problems Addressed this Visit          Cardiac and Vasculature    Thoracic aortic aneurysm without rupture - Primary    Essential hypertension    Relevant Medications    metoprolol succinate XL (TOPROL-XL) 25 MG 24 hr tablet    Paroxysmal SVT (supraventricular tachycardia)    Relevant Medications     metoprolol succinate XL (TOPROL-XL) 25 MG 24 hr tablet     Diagnoses         Codes Comments      Thoracic aortic aneurysm without rupture, unspecified part    -  Primary ICD-10-CM: I71.20  ICD-9-CM: 441.2       Paroxysmal SVT (supraventricular tachycardia)     ICD-10-CM: I47.10  ICD-9-CM: 427.0       Essential hypertension     ICD-10-CM: I10  ICD-9-CM: 401.9           Recommendations  1.  Patient is a 65-year-old female presenting for evaluation doing well with history of SVT.  Patient is on beta-blocker therapy.  Patient does not complain of any dysrhythmic symptoms.  For now, we can monitor.    2.  History of thoracic aortic aneurysm followed by vascular surgery.  For now, we will try to continue blood pressure management.  It apparently is elevated today because of her increased stress and difficulty getting to the office.  We can monitor for now.    3.  Overall, she appears stable.  Dyspnea is mild at this time.  We can see her back for follow-up in 6 months to a year or sooner for symptoms.  Follow-up with primary as scheduled.             Victor M Mao  reports that she quit smoking about 3 years ago. Her smoking use included cigarettes. She started smoking about 37 years ago. She has a 8.5 pack-year smoking history. She has never used smokeless tobacco.       Patient did not bring med list or medicine bottles to appointment, med list has been reviewed and updated based on patient's knowledge of their meds.      Advance Care Planning  ACP discussion was declined by the patient. Patient does not have an advance directive, declines further assistance.      Electronically signed by:

## 2025-06-16 NOTE — PROGRESS NOTES
Problem list     Subjective   Victor M Mao is a 65 y.o. female     Chief Complaint   Patient presents with    Yearly follow up     Hx Paroxysmal SVT (supraventricular tachycardia)     Problem list  1.  Ascending aortic aneurysm  1.1 last estimation in 2021 and 4.1 to 4.2 cm followed by Dr. Zurita at [facility]  2.  Preserved systolic function  3.  Palpitations  3.1 event monitor 2021 with PACs and SVT  4.  Hypertension    HPI    Patient is a 65-year-old female senting to the office to be evaluated.  Clinically, she appears to have done remarkably well.  She does not describe any chest pain or pressure.  She does describe a degree of weight gain and that has contributed, she feels, to her dyspnea.  It apparently is mild when trying to exert or do activity.  She does not describe PND or orthopnea.    She does not describe palpitating nor she complain of dysrhythmic symptoms.  She has been under some amount of stress which she feels is contributing to her blood pressure.  Otherwise, she appears to be doing well.      Current Outpatient Medications on File Prior to Visit   Medication Sig Dispense Refill    ALLERGY RELIEF 10 MG tablet Take 1 tablet by mouth Daily.  1    atorvastatin (LIPITOR) 20 MG tablet Take 1 tablet by mouth Daily.      Breztri Aerosphere 160-9-4.8 MCG/ACT aerosol inhaler USE 2 PUFFS EVERY 12 HOURS      Cholecalciferol (Vitamin D3) 50 MCG (2000 UT) tablet Take 1 tablet by mouth Daily.      escitalopram (LEXAPRO) 20 MG tablet Take 1 tablet by mouth Daily.      hydroCHLOROthiazide (HYDRODIURIL) 25 MG tablet Take 1 tablet by mouth Every Morning.      meloxicam (MOBIC) 15 MG tablet Take 1 tablet by mouth Daily.  1    metFORMIN ER (GLUCOPHAGE-XR) 500 MG 24 hr tablet Take 1 tablet by mouth Daily.      O2 (OXYGEN) Inhale 2 L/min Every Night.      omeprazole (priLOSEC) 20 MG capsule Take 1 capsule by mouth Daily.      QUEtiapine (SEROquel) 50 MG tablet TAKE ONE TABLET BY MOUTH AT BEDTIME ONCE  DAILY      [DISCONTINUED] metoprolol succinate XL (TOPROL-XL) 25 MG 24 hr tablet Take 1 tablet by mouth Daily. 90 tablet 5    [DISCONTINUED] DOXYCYCLINE CALCIUM PO Take  by mouth 2 (Two) Times a Day. (Patient not taking: Reported on 10/30/2024)       No current facility-administered medications on file prior to visit.       Patient has no known allergies.    Past Medical History:   Diagnosis Date    Anxiety     Arrhythmia     Arthritis     Chronic kidney disease     COPD (chronic obstructive pulmonary disease)     Degenerative disc disease, cervical     Depression     Diabetes mellitus     GERD (gastroesophageal reflux disease)     HTN (hypertension)     Hyperlipidemia     Lung nodule     Palpitation     Thoracic aortic aneurysm        Social History     Socioeconomic History    Marital status:     Number of children: 1   Tobacco Use    Smoking status: Former     Current packs/day: 0.00     Average packs/day: 0.3 packs/day for 34.0 years (8.5 ttl pk-yrs)     Types: Cigarettes     Start date: 7/4/1987     Quit date: 7/4/2021     Years since quitting: 3.9    Smokeless tobacco: Never    Tobacco comments:     smokes 10 cigs a day or less   Vaping Use    Vaping status: Former    Substances: Nicotine   Substance and Sexual Activity    Alcohol use: Yes     Comment: very seldom    Drug use: Yes     Types: Marijuana    Sexual activity: Defer       Family History   Problem Relation Age of Onset    Coronary artery disease Mother     Heart attack Mother     Aneurysm Father         aortic arch aneurysm    Heart attack Brother     Coronary artery disease Brother        Review of Systems   Constitutional:  Negative for activity change, appetite change, chills, fatigue and fever.   HENT: Negative.  Negative for congestion, sinus pressure and sinus pain.    Eyes: Negative.  Negative for visual disturbance.   Respiratory:  Positive for shortness of breath (w/ exertion). Negative for apnea, cough, chest tightness and wheezing.     Cardiovascular: Negative.  Negative for chest pain, palpitations and leg swelling.   Gastrointestinal: Negative.  Negative for blood in stool.   Endocrine: Negative.  Negative for cold intolerance and heat intolerance.   Genitourinary: Negative.  Negative for hematuria.   Musculoskeletal: Negative.  Negative for gait problem.   Skin: Negative.  Negative for color change, rash and wound.   Allergic/Immunologic: Negative.  Negative for environmental allergies and food allergies.   Neurological:  Negative for dizziness, syncope, weakness, light-headedness, numbness and headaches.   Hematological:  Bruises/bleeds easily.   Psychiatric/Behavioral: Negative.  Negative for sleep disturbance.        Objective   Vitals:    06/16/25 1447   BP: (!) 140/103   BP Location: Left arm   Patient Position: Sitting   Cuff Size: Adult   Pulse: 81   SpO2: 93%   Weight: 74.8 kg (165 lb)      BP (!) 140/103 (BP Location: Left arm, Patient Position: Sitting, Cuff Size: Adult)   Pulse 81   Wt 74.8 kg (165 lb)   SpO2 93%   BMI 31.18 kg/m²     Lab Results (most recent)       None            Physical Exam    Procedure   Procedures       Assessment & Plan     Problems Addressed this Visit          Cardiac and Vasculature    Thoracic aortic aneurysm without rupture - Primary    Essential hypertension    Relevant Medications    metoprolol succinate XL (TOPROL-XL) 25 MG 24 hr tablet    Paroxysmal SVT (supraventricular tachycardia)    Relevant Medications    metoprolol succinate XL (TOPROL-XL) 25 MG 24 hr tablet     Diagnoses         Codes Comments      Thoracic aortic aneurysm without rupture, unspecified part    -  Primary ICD-10-CM: I71.20  ICD-9-CM: 441.2       Paroxysmal SVT (supraventricular tachycardia)     ICD-10-CM: I47.10  ICD-9-CM: 427.0       Essential hypertension     ICD-10-CM: I10  ICD-9-CM: 401.9           Recommendations  1.  Patient is a 65-year-old female presenting for evaluation doing well with history of SVT.  Patient is  on beta-blocker therapy.  Patient does not complain of any dysrhythmic symptoms.  For now, we can monitor.    2.  History of thoracic aortic aneurysm followed by vascular surgery.  For now, we will try to continue blood pressure management.  It apparently is elevated today because of her increased stress and difficulty getting to the office.  We can monitor for now.    3.  Overall, she appears stable.  Dyspnea is mild at this time.  We can see her back for follow-up in 6 months to a year or sooner for symptoms.  Follow-up with primary as scheduled.             Victor M Mao  reports that she quit smoking about 3 years ago. Her smoking use included cigarettes. She started smoking about 37 years ago. She has a 8.5 pack-year smoking history. She has never used smokeless tobacco.       Patient did not bring med list or medicine bottles to appointment, med list has been reviewed and updated based on patient's knowledge of their meds.      Advance Care Planning   ACP discussion was declined by the patient. Patient does not have an advance directive, declines further assistance.      Electronically signed by:

## 2025-08-13 DIAGNOSIS — R91.1 LUNG NODULE: ICD-10-CM

## 2025-08-13 DIAGNOSIS — I71.20 THORACIC AORTIC ANEURYSM WITHOUT RUPTURE, UNSPECIFIED PART: Primary | ICD-10-CM
